# Patient Record
Sex: MALE | Race: WHITE | NOT HISPANIC OR LATINO
[De-identification: names, ages, dates, MRNs, and addresses within clinical notes are randomized per-mention and may not be internally consistent; named-entity substitution may affect disease eponyms.]

---

## 2017-01-13 ENCOUNTER — APPOINTMENT (OUTPATIENT)
Dept: CARDIOLOGY | Facility: CLINIC | Age: 72
End: 2017-01-13

## 2017-04-12 ENCOUNTER — APPOINTMENT (OUTPATIENT)
Dept: CARDIOLOGY | Facility: CLINIC | Age: 72
End: 2017-04-12

## 2017-04-12 VITALS
HEIGHT: 69 IN | SYSTOLIC BLOOD PRESSURE: 136 MMHG | HEART RATE: 103 BPM | DIASTOLIC BLOOD PRESSURE: 80 MMHG | WEIGHT: 205 LBS | BODY MASS INDEX: 30.36 KG/M2

## 2017-06-06 ENCOUNTER — MEDICATION RENEWAL (OUTPATIENT)
Age: 72
End: 2017-06-06

## 2017-09-07 ENCOUNTER — APPOINTMENT (OUTPATIENT)
Dept: CARDIOLOGY | Facility: CLINIC | Age: 72
End: 2017-09-07

## 2017-09-07 VITALS
HEART RATE: 99 BPM | WEIGHT: 209 LBS | DIASTOLIC BLOOD PRESSURE: 78 MMHG | BODY MASS INDEX: 30.96 KG/M2 | HEIGHT: 69 IN | SYSTOLIC BLOOD PRESSURE: 130 MMHG

## 2017-09-07 DIAGNOSIS — R07.9 CHEST PAIN, UNSPECIFIED: ICD-10-CM

## 2018-01-11 ENCOUNTER — APPOINTMENT (OUTPATIENT)
Dept: CARDIOLOGY | Facility: CLINIC | Age: 73
End: 2018-01-11

## 2018-01-11 VITALS
DIASTOLIC BLOOD PRESSURE: 78 MMHG | BODY MASS INDEX: 30.36 KG/M2 | SYSTOLIC BLOOD PRESSURE: 146 MMHG | HEIGHT: 69 IN | HEART RATE: 106 BPM | WEIGHT: 205 LBS

## 2018-05-30 ENCOUNTER — RX RENEWAL (OUTPATIENT)
Age: 73
End: 2018-05-30

## 2018-06-14 ENCOUNTER — APPOINTMENT (OUTPATIENT)
Dept: CARDIOLOGY | Facility: CLINIC | Age: 73
End: 2018-06-14

## 2018-06-14 VITALS
BODY MASS INDEX: 29.33 KG/M2 | HEART RATE: 100 BPM | SYSTOLIC BLOOD PRESSURE: 136 MMHG | WEIGHT: 198 LBS | HEIGHT: 69 IN | DIASTOLIC BLOOD PRESSURE: 60 MMHG

## 2018-06-14 DIAGNOSIS — Z98.61 CORONARY ANGIOPLASTY STATUS: ICD-10-CM

## 2018-07-10 ENCOUNTER — APPOINTMENT (OUTPATIENT)
Dept: SURGERY | Facility: CLINIC | Age: 73
End: 2018-07-10
Payer: MEDICARE

## 2018-07-10 VITALS — HEIGHT: 69 IN | BODY MASS INDEX: 29.33 KG/M2 | WEIGHT: 198 LBS

## 2018-07-10 DIAGNOSIS — K40.30 UNILATERAL INGUINAL HERNIA, WITH OBSTRUCTION, W/OUT GANGRENE, NOT SPECIFIED AS RECURRENT: ICD-10-CM

## 2018-07-10 DIAGNOSIS — I25.10 ATHEROSCLEROTIC HEART DISEASE OF NATIVE CORONARY ARTERY W/OUT ANGINA PECTORIS: ICD-10-CM

## 2018-07-10 DIAGNOSIS — N43.0 ENCYSTED HYDROCELE: ICD-10-CM

## 2018-07-10 DIAGNOSIS — I10 ESSENTIAL (PRIMARY) HYPERTENSION: ICD-10-CM

## 2018-07-10 PROCEDURE — 99204 OFFICE O/P NEW MOD 45 MIN: CPT

## 2018-07-20 ENCOUNTER — INPATIENT (INPATIENT)
Facility: HOSPITAL | Age: 73
LOS: 5 days | Discharge: HOME | End: 2018-07-26
Attending: INTERNAL MEDICINE | Admitting: INTERNAL MEDICINE
Payer: MEDICARE

## 2018-07-20 VITALS
OXYGEN SATURATION: 98 % | DIASTOLIC BLOOD PRESSURE: 75 MMHG | TEMPERATURE: 97 F | RESPIRATION RATE: 19 BRPM | HEART RATE: 108 BPM | SYSTOLIC BLOOD PRESSURE: 136 MMHG

## 2018-07-20 LAB
ALBUMIN SERPL ELPH-MCNC: 3.5 G/DL — SIGNIFICANT CHANGE UP (ref 3.5–5.2)
ALP SERPL-CCNC: 317 U/L — HIGH (ref 30–115)
ALT FLD-CCNC: 36 U/L — SIGNIFICANT CHANGE UP (ref 0–41)
ANION GAP SERPL CALC-SCNC: 15 MMOL/L — HIGH (ref 7–14)
AST SERPL-CCNC: 139 U/L — HIGH (ref 0–41)
BASOPHILS # BLD AUTO: 0.01 K/UL — SIGNIFICANT CHANGE UP (ref 0–0.2)
BASOPHILS NFR BLD AUTO: 0.2 % — SIGNIFICANT CHANGE UP (ref 0–1)
BILIRUB DIRECT SERPL-MCNC: 0.6 MG/DL — HIGH (ref 0–0.2)
BILIRUB INDIRECT FLD-MCNC: 1.2 MG/DL — SIGNIFICANT CHANGE UP (ref 0.2–1.2)
BILIRUB SERPL-MCNC: 1.7 MG/DL — HIGH (ref 0.2–1.2)
BILIRUB SERPL-MCNC: 1.8 MG/DL — HIGH (ref 0.2–1.2)
BUN SERPL-MCNC: 13 MG/DL — SIGNIFICANT CHANGE UP (ref 10–20)
CALCIUM SERPL-MCNC: 9.1 MG/DL — SIGNIFICANT CHANGE UP (ref 8.5–10.1)
CHLORIDE SERPL-SCNC: 100 MMOL/L — SIGNIFICANT CHANGE UP (ref 98–110)
CO2 SERPL-SCNC: 25 MMOL/L — SIGNIFICANT CHANGE UP (ref 17–32)
CREAT SERPL-MCNC: 0.8 MG/DL — SIGNIFICANT CHANGE UP (ref 0.7–1.5)
EOSINOPHIL # BLD AUTO: 0.06 K/UL — SIGNIFICANT CHANGE UP (ref 0–0.7)
EOSINOPHIL NFR BLD AUTO: 1.4 % — SIGNIFICANT CHANGE UP (ref 0–8)
GLUCOSE SERPL-MCNC: 104 MG/DL — HIGH (ref 70–99)
HCT VFR BLD CALC: 42.7 % — SIGNIFICANT CHANGE UP (ref 42–52)
HGB BLD-MCNC: 13.9 G/DL — LOW (ref 14–18)
IMM GRANULOCYTES NFR BLD AUTO: 0.5 % — HIGH (ref 0.1–0.3)
LIDOCAIN IGE QN: 20 U/L — SIGNIFICANT CHANGE UP (ref 7–60)
LYMPHOCYTES # BLD AUTO: 0.75 K/UL — LOW (ref 1.2–3.4)
LYMPHOCYTES # BLD AUTO: 17.2 % — LOW (ref 20.5–51.1)
MCHC RBC-ENTMCNC: 30 PG — SIGNIFICANT CHANGE UP (ref 27–31)
MCHC RBC-ENTMCNC: 32.6 G/DL — SIGNIFICANT CHANGE UP (ref 32–37)
MCV RBC AUTO: 92.2 FL — SIGNIFICANT CHANGE UP (ref 80–94)
MONOCYTES # BLD AUTO: 0.39 K/UL — SIGNIFICANT CHANGE UP (ref 0.1–0.6)
MONOCYTES NFR BLD AUTO: 8.9 % — SIGNIFICANT CHANGE UP (ref 1.7–9.3)
NEUTROPHILS # BLD AUTO: 3.14 K/UL — SIGNIFICANT CHANGE UP (ref 1.4–6.5)
NEUTROPHILS NFR BLD AUTO: 71.8 % — SIGNIFICANT CHANGE UP (ref 42.2–75.2)
NRBC # BLD: 0 /100 WBCS — SIGNIFICANT CHANGE UP (ref 0–0)
PLATELET # BLD AUTO: 132 K/UL — SIGNIFICANT CHANGE UP (ref 130–400)
POTASSIUM SERPL-MCNC: 4.5 MMOL/L — SIGNIFICANT CHANGE UP (ref 3.5–5)
POTASSIUM SERPL-SCNC: 4.5 MMOL/L — SIGNIFICANT CHANGE UP (ref 3.5–5)
PROT SERPL-MCNC: 6.9 G/DL — SIGNIFICANT CHANGE UP (ref 6–8)
RBC # BLD: 4.63 M/UL — LOW (ref 4.7–6.1)
RBC # FLD: 13.9 % — SIGNIFICANT CHANGE UP (ref 11.5–14.5)
SODIUM SERPL-SCNC: 140 MMOL/L — SIGNIFICANT CHANGE UP (ref 135–146)
WBC # BLD: 4.37 K/UL — LOW (ref 4.8–10.8)
WBC # FLD AUTO: 4.37 K/UL — LOW (ref 4.8–10.8)

## 2018-07-20 RX ORDER — SODIUM CHLORIDE 9 MG/ML
1000 INJECTION INTRAMUSCULAR; INTRAVENOUS; SUBCUTANEOUS
Qty: 0 | Refills: 0 | Status: DISCONTINUED | OUTPATIENT
Start: 2018-07-20 | End: 2018-07-22

## 2018-07-20 RX ORDER — IOHEXOL 300 MG/ML
30 INJECTION, SOLUTION INTRAVENOUS ONCE
Qty: 0 | Refills: 0 | Status: COMPLETED | OUTPATIENT
Start: 2018-07-20 | End: 2018-07-20

## 2018-07-20 RX ADMIN — SODIUM CHLORIDE 100 MILLILITER(S): 9 INJECTION INTRAMUSCULAR; INTRAVENOUS; SUBCUTANEOUS at 18:53

## 2018-07-20 RX ADMIN — IOHEXOL 30 MILLILITER(S): 300 INJECTION, SOLUTION INTRAVENOUS at 18:53

## 2018-07-20 NOTE — ED PROVIDER NOTE - PROGRESS NOTE DETAILS
spoke to vascular team for consult, requested triple phase ct.  will come eval pt vascular team recommends heparin and admission to medicine for malignancy w/u

## 2018-07-20 NOTE — ED PROVIDER NOTE - NS ED ROS FT
Review of Systems:  	•	CONSTITUTIONAL - no fever, no diaphoresis, + weight change  	•	SKIN - no rash  	•	HEMATOLOGIC - no bleeding, no bruising  	•	EYES - no eye pain, no blurred vision  	•	ENT - no change in hearing, no pain  	•	RESPIRATORY - no shortness of breath, no cough  	•	CARDIAC - no chest pain, no palpitations  	•	GI -  abd bloating, no nausea, no vomiting, no diarrhea, + constipation, no bleeding  	•	 - no dysuria, no hematuria, no flank pain, no urinary retention  	•	MUSCULOSKELETAL - no joint paint, no swelling, no redness  	•	NEUROLOGIC - no weakness, no headache, no paresthesias, no dizziness

## 2018-07-20 NOTE — ED PROVIDER NOTE - PHYSICAL EXAMINATION
VITAL SIGNS: I have reviewed nursing notes and confirm.  CONSTITUTIONAL: Well-developed; well-nourished; in no acute distress.  SKIN: Skin exam is warm and dry, no acute rash.  HEAD: Normocephalic; atraumatic.  EYES: PERRL, EOM intact; conjunctiva and sclera clear.  ENT: No nasal discharge; airway clear. TMs clear.  NECK: Supple; non tender.  CARD: S1, S2 normal; no murmurs, gallops, or rubs. Regular rate and rhythm.  RESP: No wheezes, rales or rhonchi.  ABD: Normal bowel sounds; soft; non-distended; mildly ttp suprapubic and right flank  EXT: Normal ROM. No clubbing, cyanosis or edema.

## 2018-07-20 NOTE — CONSULT NOTE ADULT - SUBJECTIVE AND OBJECTIVE BOX
VASCULAR SURGERY CONSULT NOTE    HPI: 71 yo m with pmh of hld and hernia, sent by pmd for evaluation of main portal vein occlusion.  pt says had been having abd bloating for about 2-3 weeks with mild constipation, went to pmd who did blood work.  LFTs were elevated on labs, so sent for outpt US today.  on US today, found to have multiple masses on liver, unclear if mets or primary liver malignancy and also cirrhosis.  Patient has been also found to have main portal vein occlusion so vascular was consulted  PAST MEDICAL & SURGICAL HISTORY:  Hernia  High cholesterol    ALLERGIES:No Known Allergies    HOME MEDS: Home Medications:  Aspir 81 oral delayed release tablet: 1 tab(s) orally once a day (20 Jul 2018 18:58)  Lipitor:  (20 Jul 2018 18:58)      PHYSICAL EXAM  Vital Signs Last 24 Hrs  T(C): 36.3 (20 Jul 2018 15:59), Max: 36.3 (20 Jul 2018 15:59)  T(F): 97.3 (20 Jul 2018 15:59), Max: 97.3 (20 Jul 2018 15:59)  HR: 108 (20 Jul 2018 15:59) (108 - 108)  BP: 136/75 (20 Jul 2018 15:59) (136/75 - 136/75)  BP(mean): --  RR: 19 (20 Jul 2018 15:59) (19 - 19)  SpO2: 98% (20 Jul 2018 15:59) (98% - 98%)  	  Gen: no acute distress, sitting up in bed.  RESP: No wheezes, rales or rhonchi.  ABD: Normal bowel sounds; soft; non-distended; tender to palpation in ruq, no guarding or rigidity.   EXT: Normal ROM. No clubbing, cyanosis or edema, palpable dp/pt pulses b/l.       MEDICATIONS:   MEDICATIONS  (STANDING):  sodium chloride 0.9%. 1000 milliLiter(s) (100 mL/Hr) IV Continuous <Continuous>    MEDICATIONS  (PRN):      LAB/STUDIES:                        13.9   4.37  )-----------( 132      ( 20 Jul 2018 18:05 )             42.7     07-20    140  |  100  |  13  ----------------------------<  104<H>  4.5   |  25  |  0.8    Ca    9.1      20 Jul 2018 18:05    TPro  6.9  /  Alb  3.5  /  TBili  1.7<H>  /  DBili  0.6<H>  /  AST  139<H>  /  ALT  36  /  AlkPhos  317<H>  07-20      LIVER FUNCTIONS - ( 20 Jul 2018 18:05 )  Alb: 3.5 g/dL / Pro: 6.9 g/dL / ALK PHOS: 317 U/L / ALT: 36 U/L / AST: 139 U/L / GGT: x             IMAGING: pending

## 2018-07-20 NOTE — CONSULT NOTE ADULT - ASSESSMENT
A: 71 yo m with pmh of hld and hernia, sent by pmd for evaluation. Patient states he has been having abd bloating for about 2-3 weeks with mild constipation, went to pmd who did blood work.  LFTs were elevated on labs, so sent for outpt US today.  on US today, found to have multiple masses on liver, unclear if mets or primary liver malignancy and also cirrhosis.  Patient has been also found to have main portal vein occlusion so vascular was consulted.    Plan:  -f/u imaging A: 71 yo m with pmh of hld and hernia, sent by pmd for evaluation. Patient states he has been having abd bloating for about 2-3 weeks with mild constipation, went to pmd who did blood work.  LFTs were elevated on labs, so sent for outpt US today.  on US today, found to have multiple masses on liver, unclear if mets or primary liver malignancy and also cirrhosis.  Patient has been also found to have main portal vein occlusion so vascular was consulted.    Plan:  -recommend anticoagulation

## 2018-07-20 NOTE — ED ADULT NURSE NOTE - OBJECTIVE STATEMENT
Pt was sent in by PMD for abnormal US. Pt states his md told him he has an obstruction by the liver.

## 2018-07-20 NOTE — ED PROVIDER NOTE - MEDICAL DECISION MAKING DETAILS
pt with portal vein occlusion likely 2/2 newly found liver malignancy.  will admit for malignancy w/u and antiocoag

## 2018-07-20 NOTE — ED PROVIDER NOTE - OBJECTIVE STATEMENT
71 yo m with pmh of hld and hernia, sent by pmd for evaluation of main portal vein occlusion.  pt says had been having abd bloating for about 2-3 weeks with mild constipation, went to pmd who did blood work.  LFTs were elevated on labs, so sent for outpt US today.  on US today, found to have multiple masses on liver, unclear if mets or primary liver malignancy and also cirrhosis.  also found to have main portal vein occlusion.  no n/v/d, no fever, no chills, no cp, no sob.  noted 20 lb involuntary weight loss

## 2018-07-21 DIAGNOSIS — I25.10 ATHEROSCLEROTIC HEART DISEASE OF NATIVE CORONARY ARTERY WITHOUT ANGINA PECTORIS: Chronic | ICD-10-CM

## 2018-07-21 LAB
ALBUMIN SERPL ELPH-MCNC: 3.8 G/DL — SIGNIFICANT CHANGE UP (ref 3.5–5.2)
ALP SERPL-CCNC: 319 U/L — HIGH (ref 30–115)
ALT FLD-CCNC: 38 U/L — SIGNIFICANT CHANGE UP (ref 0–41)
ANION GAP SERPL CALC-SCNC: 18 MMOL/L — HIGH (ref 7–14)
APTT BLD: 34.6 SEC — SIGNIFICANT CHANGE UP (ref 27–39.2)
APTT BLD: 54.2 SEC — HIGH (ref 27–39.2)
APTT BLD: 63.3 SEC — HIGH (ref 27–39.2)
AST SERPL-CCNC: 143 U/L — HIGH (ref 0–41)
BASOPHILS # BLD AUTO: 0.03 K/UL — SIGNIFICANT CHANGE UP (ref 0–0.2)
BASOPHILS NFR BLD AUTO: 0.4 % — SIGNIFICANT CHANGE UP (ref 0–1)
BILIRUB DIRECT SERPL-MCNC: 0.7 MG/DL — HIGH (ref 0–0.2)
BILIRUB SERPL-MCNC: 2.2 MG/DL — HIGH (ref 0.2–1.2)
BLD GP AB SCN SERPL QL: SIGNIFICANT CHANGE UP
BLD GP AB SCN SERPL QL: SIGNIFICANT CHANGE UP
BUN SERPL-MCNC: 11 MG/DL — SIGNIFICANT CHANGE UP (ref 10–20)
CALCIUM SERPL-MCNC: 9.2 MG/DL — SIGNIFICANT CHANGE UP (ref 8.5–10.1)
CHLORIDE SERPL-SCNC: 99 MMOL/L — SIGNIFICANT CHANGE UP (ref 98–110)
CO2 SERPL-SCNC: 22 MMOL/L — SIGNIFICANT CHANGE UP (ref 17–32)
CREAT SERPL-MCNC: 0.8 MG/DL — SIGNIFICANT CHANGE UP (ref 0.7–1.5)
EOSINOPHIL # BLD AUTO: 0.12 K/UL — SIGNIFICANT CHANGE UP (ref 0–0.7)
EOSINOPHIL NFR BLD AUTO: 1.8 % — SIGNIFICANT CHANGE UP (ref 0–8)
GLUCOSE SERPL-MCNC: 98 MG/DL — SIGNIFICANT CHANGE UP (ref 70–99)
HCT VFR BLD CALC: 43 % — SIGNIFICANT CHANGE UP (ref 42–52)
HGB BLD-MCNC: 14.1 G/DL — SIGNIFICANT CHANGE UP (ref 14–18)
IMM GRANULOCYTES NFR BLD AUTO: 0.1 % — SIGNIFICANT CHANGE UP (ref 0.1–0.3)
INR BLD: 1.38 RATIO — HIGH (ref 0.65–1.3)
INR BLD: 1.45 RATIO — HIGH (ref 0.65–1.3)
LDH SERPL L TO P-CCNC: 269 U/L — HIGH (ref 50–242)
LYMPHOCYTES # BLD AUTO: 1.34 K/UL — SIGNIFICANT CHANGE UP (ref 1.2–3.4)
LYMPHOCYTES # BLD AUTO: 19.6 % — LOW (ref 20.5–51.1)
MAGNESIUM SERPL-MCNC: 1.9 MG/DL — SIGNIFICANT CHANGE UP (ref 1.8–2.4)
MCHC RBC-ENTMCNC: 29.7 PG — SIGNIFICANT CHANGE UP (ref 27–31)
MCHC RBC-ENTMCNC: 32.8 G/DL — SIGNIFICANT CHANGE UP (ref 32–37)
MCV RBC AUTO: 90.7 FL — SIGNIFICANT CHANGE UP (ref 80–94)
MONOCYTES # BLD AUTO: 0.52 K/UL — SIGNIFICANT CHANGE UP (ref 0.1–0.6)
MONOCYTES NFR BLD AUTO: 7.6 % — SIGNIFICANT CHANGE UP (ref 1.7–9.3)
NEUTROPHILS # BLD AUTO: 4.82 K/UL — SIGNIFICANT CHANGE UP (ref 1.4–6.5)
NEUTROPHILS NFR BLD AUTO: 70.5 % — SIGNIFICANT CHANGE UP (ref 42.2–75.2)
NRBC # BLD: 0 /100 WBCS — SIGNIFICANT CHANGE UP (ref 0–0)
PLATELET # BLD AUTO: 169 K/UL — SIGNIFICANT CHANGE UP (ref 130–400)
POTASSIUM SERPL-MCNC: 4.6 MMOL/L — SIGNIFICANT CHANGE UP (ref 3.5–5)
POTASSIUM SERPL-SCNC: 4.6 MMOL/L — SIGNIFICANT CHANGE UP (ref 3.5–5)
PROT SERPL-MCNC: 7.1 G/DL — SIGNIFICANT CHANGE UP (ref 6–8)
PROTHROM AB SERPL-ACNC: 14.9 SEC — HIGH (ref 9.95–12.87)
PROTHROM AB SERPL-ACNC: 15.6 SEC — HIGH (ref 9.95–12.87)
RBC # BLD: 4.74 M/UL — SIGNIFICANT CHANGE UP (ref 4.7–6.1)
RBC # FLD: 13.9 % — SIGNIFICANT CHANGE UP (ref 11.5–14.5)
SODIUM SERPL-SCNC: 139 MMOL/L — SIGNIFICANT CHANGE UP (ref 135–146)
TYPE + AB SCN PNL BLD: SIGNIFICANT CHANGE UP
TYPE + AB SCN PNL BLD: SIGNIFICANT CHANGE UP
WBC # BLD: 6.84 K/UL — SIGNIFICANT CHANGE UP (ref 4.8–10.8)
WBC # FLD AUTO: 6.84 K/UL — SIGNIFICANT CHANGE UP (ref 4.8–10.8)

## 2018-07-21 RX ORDER — HEPARIN SODIUM 5000 [USP'U]/ML
1600 INJECTION INTRAVENOUS; SUBCUTANEOUS
Qty: 25000 | Refills: 0 | Status: DISCONTINUED | OUTPATIENT
Start: 2018-07-21 | End: 2018-07-21

## 2018-07-21 RX ORDER — ATORVASTATIN CALCIUM 80 MG/1
0 TABLET, FILM COATED ORAL
Qty: 0 | Refills: 0 | COMMUNITY

## 2018-07-21 RX ORDER — HEPARIN SODIUM 5000 [USP'U]/ML
1100 INJECTION INTRAVENOUS; SUBCUTANEOUS
Qty: 25000 | Refills: 0 | Status: DISCONTINUED | OUTPATIENT
Start: 2018-07-21 | End: 2018-07-23

## 2018-07-21 RX ORDER — HEPARIN SODIUM 5000 [USP'U]/ML
3000 INJECTION INTRAVENOUS; SUBCUTANEOUS ONCE
Qty: 0 | Refills: 0 | Status: COMPLETED | OUTPATIENT
Start: 2018-07-21 | End: 2018-07-21

## 2018-07-21 RX ORDER — ASPIRIN/CALCIUM CARB/MAGNESIUM 324 MG
81 TABLET ORAL DAILY
Qty: 0 | Refills: 0 | Status: DISCONTINUED | OUTPATIENT
Start: 2018-07-21 | End: 2018-07-23

## 2018-07-21 RX ORDER — ATORVASTATIN CALCIUM 80 MG/1
40 TABLET, FILM COATED ORAL AT BEDTIME
Qty: 0 | Refills: 0 | Status: DISCONTINUED | OUTPATIENT
Start: 2018-07-21 | End: 2018-07-21

## 2018-07-21 RX ORDER — HEPARIN SODIUM 5000 [USP'U]/ML
900 INJECTION INTRAVENOUS; SUBCUTANEOUS
Qty: 25000 | Refills: 0 | Status: DISCONTINUED | OUTPATIENT
Start: 2018-07-21 | End: 2018-07-21

## 2018-07-21 RX ADMIN — HEPARIN SODIUM 9 UNIT(S)/HR: 5000 INJECTION INTRAVENOUS; SUBCUTANEOUS at 02:38

## 2018-07-21 RX ADMIN — HEPARIN SODIUM 3000 UNIT(S): 5000 INJECTION INTRAVENOUS; SUBCUTANEOUS at 02:38

## 2018-07-21 RX ADMIN — Medication 81 MILLIGRAM(S): at 13:12

## 2018-07-21 RX ADMIN — HEPARIN SODIUM 1100 UNIT(S)/HR: 5000 INJECTION INTRAVENOUS; SUBCUTANEOUS at 09:25

## 2018-07-21 NOTE — H&P ADULT - ATTENDING COMMENTS
patient seen and examined , agree with pgy 3 assesment and plan except as indicated above,   #Acute portal vein thrombosis secondary to neoplasm likely hcc  appreciate gi cosnult, need workup , mri , dw gi fellow , further patient is currently on anticoagualtion for now until clarrified if thrombosis is tumor invasion or not, oncology consult   #Splenomegaly   #degenerative changes in shoulder  #prolonged qt on ekg - repeat ekg in am   dw pmd who reviewed records via telephone

## 2018-07-21 NOTE — CONSULT NOTE ADULT - ASSESSMENT
72 year old man with a past medical history of HLD and Hernia was getting blood work for hernia surgery found to have increase lever enzymes , sent for us found to have cirrhosis. Patient been complaining of abdominal bloating for about a week, with no pain, associated with non bloody diarrhea , 6 episodes daily, with mucous, not related to food and wakes him up at night.     Newly diagnosed cirrhosis with hepatic tumor likely HCC with tumor thrombus  If it is tumor thrombus not a bland thrombus  anticoagulation is contraindicated,  will discuss with radiology   possible HCC with macrovascular invasion, with possible metastasis:  MRI with liver protocol, bao fetoprotein, if it  confirms the diagnosis   poor prognosis, and  not candidate for transplant  child davis score: A  Meld score Na: 13  needs systemic therapy , recommend oncology eval   newly diagnosed cirrhosis needs CLD workup: hepatits panel, ABBI, AMA, ASMA, SPEP, Ceruloplasmin, ferritin, transferin saturation, ANTI LKM1, ANTI SLA  needs EGD for screening for varices, specially if he is candidate for anticoagulation   Needs screening  colonoscopy   will discuss with attending 72 year old man with a past medical history of HLD and Hernia was getting blood work for hernia surgery found to have increase Liver enzymes ,  found to have cirrhosis. Patient been complaining of abdominal bloating for about a week, with no pain, associated with non bloody diarrhea , 6 episodes daily, with mucous, not related to food and wakes him up at night.     Newly diagnosed cirrhosis of unclear etiology with hepatic tumor likely HCC with tumor thrombus  If it is tumor thrombus not a bland thrombus  anticoagulation is contraindicated,  will discuss with radiology   -possible HCC with macrovascular invasion, with possible metastasis:  MRI with liver protocol, bao fetoprotein, if it  confirms the diagnosis   poor prognosis, and  not -candidate for transplant  -child davis score: A  -Meld score Na: 13  -needs IR for TACE or Y90 , also SORAFINIB systemic therapy   -newly diagnosed cirrhosis needs CLD workup: hepatits panel, ABBI, AMA, ASMA, SPEP, Ceruloplasmin, ferritin, transferin saturation, ANTI LKM1, ANTI SLA  needs EGD for screening for varices, specially if he is candidate for anticoagulation

## 2018-07-21 NOTE — H&P ADULT - NSHPPHYSICALEXAM_GEN_ALL_CORE
Vital Signs Last 24 Hrs  T(C): 36.3 (20 Jul 2018 15:59), Max: 36.3 (20 Jul 2018 15:59)  T(F): 97.3 (20 Jul 2018 15:59), Max: 97.3 (20 Jul 2018 15:59)  HR: 108 (20 Jul 2018 15:59) (108 - 108)  BP: 136/75 (20 Jul 2018 15:59) (136/75 - 136/75)  BP(mean): --  RR: 19 (20 Jul 2018 15:59) (19 - 19)  SpO2: 98% (20 Jul 2018 15:59) (98% - 98%)    General: Well-developed; well-nourished; NAD, sitting up in bed  HEENT: NCAT, EOMI, PERRLA, conjuctiva and sclera clear, no nasal discharge, no oropharyngeal erythema, neck supple and non tender  Cardio: S1 S2 normal, RRR, no murmurs  Lungs: CTAB, GBAE, no crackles, no wheezes  Abdomen: +BS, soft, mild tenderness to palpation at RUQ, negative Back, splenomegaly, no shifting dullness appreciated, no guarding, no rigidity,   Extremities: Normal ROM, +2 PP b/l, -ve LLE b/l  Skin: warm and dry, no acute rash, no bruising, no ecchymosis

## 2018-07-21 NOTE — H&P ADULT - NSHPLABSRESULTS_GEN_ALL_CORE
Labs                          13.9   4.37  )-----------( 132      ( 20 Jul 2018 18:05 )             42.7     07-20    140  |  100  |  13  ----------------------------<  104<H>  4.5   |  25  |  0.8    Ca    9.1      20 Jul 2018 18:05    TPro  6.9  /  Alb  3.5  /  TBili  1.7<H>  /  DBili  0.6<H>  /  AST  139<H>  /  ALT  36  /  AlkPhos  317<H>  07-20    PT/INR - ( 21 Jul 2018 00:37 )   PT: 15.60 sec;   INR: 1.45 ratio    PTT - ( 21 Jul 2018 00:37 )  PTT:34.6 sec    Lipase, Serum (07.20.18 @ 18:05)    Lipase, Serum: 20 U/L      Radiology  < from: Xray Chest 1 View-PORTABLE IMMEDIATE (07.20.18 @ 18:49) >  No radiographic evidence of acute cardiopulmonary disease.  < end of copied text >    < from: CT Angio Abdomen and Pelvis w/ IV Cont (07.20.18 @ 22:35) >  Cholelithiasis.  1. Findings consistent with diffuse tumor infiltration, likely HCC, throughout right hepatic lobe up to 15 cm with of the right intrahepatic and distal extrahepatic portal veins; likely tumor thrombosis. Additional, at least two hypovascular masses within the left hepatic lobe, measuring up to 1.2 cm at segment II, suggesting metastatic HCC. Partially exophytic 6 cm component off right inferior hepatic tip of tumor.  2. Liver cirrhosis with portal hypertension.  3. Nonspecific enlarged portacaval lymph nodes measuring up to 2.1 x 1.7 cm.    KIDNEYS: Subcentimeter bilateral renal hypodensities, too small to fully characterize. No hydronephrosis.  ABDOMINOPELVIC NODES: Nonspecific enlarged portacaval lymph nodes measuring up to 2.1 x 1.7 cm.  PERITONEUM/MESENTERY/BOWEL: No bowel obstruction. Mild ascites.  SPLEEN: Splenomegaly, 16.4 cm CC.  < end of copied text >

## 2018-07-21 NOTE — CONSULT NOTE ADULT - SUBJECTIVE AND OBJECTIVE BOX
GI HPI:  72 year old man with a past medical history of HLD and Hernia was getting blood work for hernia surgery found to have increase lever enzymes , sent for us found to have cirrhosis. Patient been complaining of abdominal bloating for about a week, with no pain, associated with non bloody diarrhea , 6 episodes daily, with mucous, not related to food and wakes him up at night. Patient denies any hx of GI bleed in the past. His father has a liver cirrhosis secondary to alcohol use  He denies drinking alcohol or smoking   Denies any hx of liver problem or hepatitis      Hospital course:  72 year old man with a past medical history of HLD and Hernia presenting for incidental finding of portal vein thrombosis after result of elevated LFTs on regular blood work. Patient reports that he has been having abominal bloating for about 2-3 weeks with mild constipation. PMD did blood work which showed increased LFTs and send him to get an outpatient US which showed multiple masses on liver, unclear if mets or primary liver malignancy and also cirrhosis as well as main portal vein occlusion. No fevers, chills, nausea, vomiting, diarrhea, chest pain, SOB, lower extremity swelling/warmth. Patient reports a 20lb weight loss. Patient reports that he has occasional tremors when he tries to hold something that has started 8 months ago. (21 Jul 2018 02:26)      PAST MEDICAL & SURGICAL HISTORY  Incarcerated right inguinal hernia  CAD (coronary artery disease)  Hypercholesterolemia  CAD S/P percutaneous coronary angioplasty: no stents      FAMILY HISTORY:  FAMILY HISTORY:      SOCIAL HISTORY:  smoker:   Alcohol:  Drug:    ALLERGIES:  No Known Allergies      MEDICATIONS:  MEDICATIONS  (STANDING):  aspirin enteric coated 81 milliGRAM(s) Oral daily  heparin  Infusion. 1100 Unit(s)/Hr (11 mL/Hr) IV Continuous <Continuous>  sodium chloride 0.9%. 1000 milliLiter(s) (100 mL/Hr) IV Continuous <Continuous>    MEDICATIONS  (PRN):      HOME MEDICATIONS:  Home Medications:  Aspir 81 oral delayed release tablet: 1 tab(s) orally once a day (20 Jul 2018 18:58)  Lipitor: 40 milligram(s) orally once a day (at bedtime) (21 Jul 2018 02:52)      ROS:     REVIEW OF SYSTEMS:    CONSTITUTIONAL:  weakness, fatigue,  weight loss of 20 pounds unintentional   Throat: No Dysphagia, No Odynophagia  RESPIRATORY: No SOB  CARDIOVASCULAR: No chest pain   Muscloskeletal: no joints pain  NEUROLOGICAL: No syncope or diziness  SKIN: No pruritis  Heme/Lymph: no LN enlargement           VITALS:   T(F): 97.1 (07-21 @ 07:48), Max: 97.3 (07-20 @ 15:59)  HR: 95 (07-21 @ 07:48) (90 - 108)  BP: 136/69 (07-21 @ 07:48) (131/78 - 136/75)  BP(mean): --  RR: 18 (07-21 @ 07:48) (18 - 19)  SpO2: 97% (07-21 @ 07:48) (97% - 98%)    I&O's Summary      PHYSICAL EXAM:  EYES: No scleral icterus   LUNG: Clear to auscultation bilaterally; No rales, rhonchi, wheezing, or rubs  HEART: RRR; No murmurs  ABDOMEN: Soft, +BS,no abdominal tenderness no guarding   Rectal Exam: not performed    LABS:                        14.1   6.84  )-----------( 169      ( 21 Jul 2018 08:32 )             43.0     PT/INR - ( 21 Jul 2018 00:37 )  INR: 1.45          PTT - ( 21 Jul 2018 00:37 )  PTT:34.6   LIVER FUNCTIONS - ( 20 Jul 2018 18:05 )  Alb: 3.5 g/dL / Pro: 6.9 g/dL / ALK PHOS: 317 U/L / ALT: 36 U/L / AST: 139 U/L / GGT: x           07-20    140  |  100  |  13  ----------------------------<  104<H>  4.5   |  25  |  0.8    Ca    9.1      20 Jul 2018 18:05              Previous EGD: never had one    from: CT Angio Abdomen and Pelvis w/ IV Cont (07.20.18 @ 22:35) >  IMPRESSION:    1. Findings consistent with diffuse tumor infiltration, likely HCC,   throughout right hepatic lobe up to 15 cm with of the right intrahepatic   and distal extrahepatic portal veins; likely tumor thrombosis.   Additional, at least two hypovascular masses within the left hepatic   lobe, measuring up to 1.2 cm at segment II, suggesting metastatic HCC.    2. Liver cirrhosis with portal hypertension.    3. Nonspecific enlarged portacaval lymph nodes measuring up to 2.1 x 1.7   cm.    < end of copied text >  ad one    Previous colonoscopy: never had one

## 2018-07-21 NOTE — H&P ADULT - HISTORY OF PRESENT ILLNESS
72 year old man with a past medical history of HLD and Hernia presenting for incidental finding of portal vein thrombosis after result of elevated LFTs on regular blood work. 72 year old man with a past medical history of HLD and Hernia presenting for incidental finding of portal vein thrombosis after result of elevated LFTs on regular blood work. Patient reports that he has been having abominal bloating for about 2-3 weeks with mild constipation. PMD did blood work which showed increased LFTs and send him to get an outpatient US which showed multiple masses on liver, unclear if mets or primary liver malignancy and also cirrhosis as well as main portal vein occlusion. No fevers, chills, nausea, vomiting, diarrhea, chest pain, SOB, lower extremity swelling/warmth. Patient reports a 20lb weight loss. 72 year old man with a past medical history of HLD and Hernia presenting for incidental finding of portal vein thrombosis after result of elevated LFTs on regular blood work. Patient reports that he has been having abominal bloating for about 2-3 weeks with mild constipation. PMD did blood work which showed increased LFTs and send him to get an outpatient US which showed multiple masses on liver, unclear if mets or primary liver malignancy and also cirrhosis as well as main portal vein occlusion. No fevers, chills, nausea, vomiting, diarrhea, chest pain, SOB, lower extremity swelling/warmth. Patient reports a 20lb weight loss. Patient reports that he has occasional tremors when he tries to hold something that has started 8 months ago.

## 2018-07-21 NOTE — H&P ADULT - PMH
Hernia    Hypercholesterolemia CAD (coronary artery disease)    Hypercholesterolemia    Incarcerated right inguinal hernia

## 2018-07-21 NOTE — H&P ADULT - ASSESSMENT
72 year old man with a past medical history of HLD and Hernia presenting for incidental finding of portal vein thrombosis after result of elevated LFTs on regular blood work.    Hepatocellular Carcinoma with Liver cirrhosis and Right intrahepatic and distal extrahepatic portal vein thrombosis + metastasis to left hepatic lobe  - Patient asymptomatic except for mild RUQ tenderness. VS wnl. Hemodynamically stable.  - Vascular surgery: Heparin [id number] cc/hr + 3000 bolus for therapeutic AC. F/u PTT In AM. Goal 65-99.  - CEA, , AFP, Acute hepatitis panel, HBV-IgGs, HCV RNA, HDV-IgG, LDH.  - F/u PTT at 4:30, 11 and 16.  - 72 year old man with a past medical history of HLD and Hernia presenting for incidental finding of portal vein thrombosis after result of elevated LFTs on regular blood work.    Hepatocellular Carcinoma with Liver cirrhosis and Right intrahepatic and distal extrahepatic portal vein thrombosis + metastasis to left hepatic lobe  - Patient asymptomatic except for mild RUQ tenderness. VS wnl. Hemodynamically stable.  - MELD Score (new): no dialysis at least twice in the past week, Crea 0.8, T-Bili 1.7, INR 1.45, Na 140; MELD Score 13 points, 6% estimated 3 month mortality but MELD+NA Score 13 points & < 2% estimated 90 day mortality  - Child- Nicole Score; T-John < 2 (+1), Albumin 2.8-3.5 (+2), INR < 1.7 (+1), Ascites slight/mild (+2), No encephalopathy (+1) = 7 points Child Class B, indication for transplant evaluation, abdominal surgery alex-operative mortality 30%  - Vascular surgery: Heparin [id number] cc/hr + 3000 bolus for therapeutic AC. F/u PTT In AM. Goal 65-99. F/u PTT at 4:30, 11 and 16.  - CEA, , AFP, Acute hepatitis panel, HBV-IgGs, HCV RNA, HDV-IgG, LDH.  - Obtain US result as outpatient; will have to follow up every 6 months. Trend LFTs.  - It would have been preferable if patient underwent screening for esophageal varices with upper endoscopy so that prophylactic therapy with a nonselective BB or endoscopic ligation could be given to those with varices that are at increased risk for bleeding and to determine the risk of variceal hemorrhage.  - Do not diurese the patient unless hypoxic and ascites causing respiratory compromise. Overdiuresis concentrates ascitic fluid, thereby raising ascitic fluid opsonic activity. Overdiuresis can precipitate renal failure and result in hepatorenal syndrome.  - Mild ascites on CT scan, unsure if too minimal to tap. GI consult to evaluate if patient needs paracenthesis to r/o SBP. Avoid PPI as may increase risk of SBP.  - Hem/Onc and IR consults pending placement for biopsy, staining and staging after discussion of goals of care.  - CTC w/ IV, CTH w/ IV after 24 hours from CTAP w/ IV. Ordered placed. If any evidence of primary or metastatic disease, will consult Pulmonary or Neurology/Neurosurgery.  - Daily weight, strict I&O, EKG in AM  - TTE for assessment of baseline function and to r/o cardiomyopathy.    HLD; statin discontinued due to hepatotoxicity. Evaluate drug of choice for lipid lowering agent. Resume outpatient ASA 81.    DVT ppx; on Heparin drip.  Diet; DASH, low sodium, fluid < 1.5L 72 year old man with a past medical history of HLD and Hernia presenting for incidental finding of portal vein thrombosis after result of elevated LFTs on regular blood work.    Hepatocellular Carcinoma with Liver cirrhosis and Right intrahepatic and distal extrahepatic portal vein thrombosis + metastasis to left hepatic lobe  - Patient asymptomatic except for mild RUQ tenderness. VS wnl. Hemodynamically stable.  - MELD Score (new): no dialysis at least twice in the past week, Crea 0.8, T-Bili 1.7, INR 1.45, Na 140; MELD Score 13 points, 6% estimated 3 month mortality but MELD+NA Score 13 points & < 2% estimated 90 day mortality  - Child- Nicole Score; T-John < 2 (+1), Albumin 2.8-3.5 (+2), INR < 1.7 (+1), Ascites slight/mild (+2), No encephalopathy (+1) = 7 points Child Class B, indication for transplant evaluation, abdominal surgery alex-operative mortality 30%  - Vascular surgery: Heparin [id number] cc/hr + 3000 bolus for therapeutic AC. F/u PTT In AM. Goal 65-99. F/u PTT at 4:30, 11 and 16.  - CEA, , AFP, Acute hepatitis panel, HBV-IgGs, HCV RNA, HDV-IgG, LDH.  - Obtain US result as outpatient; will have to follow up every 6 months. Trend LFTs.  - It would have been preferable if patient underwent screening for esophageal varices with upper endoscopy so that prophylactic therapy with a nonselective BB or endoscopic ligation could be given to those with varices that are at increased risk for bleeding and to determine the risk of variceal hemorrhage.  - Do not diurese the patient unless hypoxic and ascites causing respiratory compromise. Overdiuresis concentrates ascitic fluid, thereby raising ascitic fluid opsonic activity. Overdiuresis can precipitate renal failure and result in hepatorenal syndrome.  - Mild ascites on CT scan, unsure if too minimal to tap. GI consult to evaluate if patient needs paracenthesis to r/o SBP. Avoid PPI as may increase risk of SBP.  - Hem/Onc and IR consults pending placement for biopsy, staining and staging after discussion of goals of care.  - CTC w/ IV, CTH w/ IV after 24 hours from CTAP w/ IV. Ordered placed. If any evidence of primary or metastatic disease, will consult Pulmonary or Neurology/Neurosurgery.  - Daily weight, strict I&O, EKG in AM  - TTE for assessment of baseline function and to r/o cardiomyopathy.    HLD; statin discontinued due to hepatotoxicity. Evaluate drug of choice for lipid lowering agent. Resume outpatient ASA 81.  DVT ppx; on Heparin drip.  Diet; DASH, low sodium, fluid < 1.5L  Please discuss goals of care with patient tomorrow in order to know if should get Hem/Onc and Interventional Radiology on board. 72 year old man with a past medical history of HLD and Hernia presenting for incidental finding of portal vein thrombosis after result of elevated LFTs on regular blood work.    Hepatocellular Carcinoma with Liver cirrhosis and Right intrahepatic and distal extrahepatic portal vein thrombosis + metastasis to left hepatic lobe  - Patient asymptomatic except for mild RUQ tenderness. VS wnl. Hemodynamically stable.  - MELD Score (new): no dialysis at least twice in the past week, Crea 0.8, T-Bili 1.7, INR 1.45, Na 140; MELD Score 13 points, 6% estimated 3 month mortality but MELD+NA Score 13 points & < 2% estimated 90 day mortality  - Child- Nicole Score; T-John < 2 (+1), Albumin 2.8-3.5 (+2), INR < 1.7 (+1), Ascites slight/mild (+2), No encephalopathy (+1) = 7 points Child Class B, indication for transplant evaluation, abdominal surgery alex-operative mortality 30%  - Vascular surgery: Heparin [id number] cc/hr + 3000 bolus for therapeutic AC. F/u PTT In AM. Goal 65-99. F/u PTT at 4:30, 11 and 16.  - CEA, , AFP, Acute hepatitis panel, HBV-IgGs, HCV RNA, HDV-IgG, LDH.  - Obtain US result as outpatient; will have to follow up every 6 months. Trend LFTs.  - It would have been preferable if patient underwent screening for esophageal varices with upper endoscopy so that prophylactic therapy with a nonselective BB or endoscopic ligation could be given to those with varices that are at increased risk for bleeding and to determine the risk of variceal hemorrhage.  - Do not diurese the patient unless hypoxic and ascites causing respiratory compromise. Overdiuresis concentrates ascitic fluid, thereby raising ascitic fluid opsonic activity. Overdiuresis can precipitate renal failure and result in hepatorenal syndrome.  - Mild ascites on CT scan, unsure if too minimal to tap. GI consult to evaluate if patient needs paracenthesis to r/o SBP. Avoid PPI as may increase risk of SBP.  - Hem/Onc and IR consults placemed for biopsy, staining and staging.  - CTC w/ IV, CTH w/ IV after 24 hours from CTAP w/ IV. Ordered placed. If any evidence of primary or metastatic disease, will consult Pulmonary or Neurology/Neurosurgery.  - Daily weight, strict I&O, EKG in AM  - TTE for assessment of baseline function and to r/o cardiomyopathy.    HLD; statin discontinued due to hepatotoxicity. Evaluate drug of choice for lipid lowering agent. Resume outpatient ASA 81.  DVT ppx; on Heparin drip.  Diet; DASH, low sodium, fluid < 1.5L  Full code, patient wants treatment

## 2018-07-22 LAB
AFP-TM SERPL-MCNC: HIGH NG/ML
ANION GAP SERPL CALC-SCNC: 15 MMOL/L — HIGH (ref 7–14)
APTT BLD: 57.3 SEC — HIGH (ref 27–39.2)
BASOPHILS # BLD AUTO: 0.01 K/UL — SIGNIFICANT CHANGE UP (ref 0–0.2)
BASOPHILS NFR BLD AUTO: 0.1 % — SIGNIFICANT CHANGE UP (ref 0–1)
BUN SERPL-MCNC: 21 MG/DL — HIGH (ref 10–20)
CALCIUM SERPL-MCNC: 9.2 MG/DL — SIGNIFICANT CHANGE UP (ref 8.5–10.1)
CANCER AG19-9 SERPL-ACNC: 58 U/ML — HIGH
CEA SERPL-MCNC: 1.2 NG/ML — SIGNIFICANT CHANGE UP (ref 0–3.8)
CHLORIDE SERPL-SCNC: 97 MMOL/L — LOW (ref 98–110)
CO2 SERPL-SCNC: 24 MMOL/L — SIGNIFICANT CHANGE UP (ref 17–32)
CREAT SERPL-MCNC: 1.2 MG/DL — SIGNIFICANT CHANGE UP (ref 0.7–1.5)
EOSINOPHIL # BLD AUTO: 0 K/UL — SIGNIFICANT CHANGE UP (ref 0–0.7)
EOSINOPHIL NFR BLD AUTO: 0 % — SIGNIFICANT CHANGE UP (ref 0–8)
GLUCOSE SERPL-MCNC: 141 MG/DL — HIGH (ref 70–99)
HAV IGM SER-ACNC: SIGNIFICANT CHANGE UP
HBV CORE IGM SER-ACNC: SIGNIFICANT CHANGE UP
HBV SURFACE AB SER-ACNC: SIGNIFICANT CHANGE UP
HBV SURFACE AG SER-ACNC: SIGNIFICANT CHANGE UP
HCT VFR BLD CALC: 37.2 % — LOW (ref 42–52)
HCV AB S/CO SERPL IA: 0.22 S/CO — SIGNIFICANT CHANGE UP
HCV AB SERPL-IMP: SIGNIFICANT CHANGE UP
HGB BLD-MCNC: 12.2 G/DL — LOW (ref 14–18)
IMM GRANULOCYTES NFR BLD AUTO: 0.6 % — HIGH (ref 0.1–0.3)
LYMPHOCYTES # BLD AUTO: 1.13 K/UL — LOW (ref 1.2–3.4)
LYMPHOCYTES # BLD AUTO: 9.7 % — LOW (ref 20.5–51.1)
MAGNESIUM SERPL-MCNC: 1.9 MG/DL — SIGNIFICANT CHANGE UP (ref 1.8–2.4)
MCHC RBC-ENTMCNC: 29.8 PG — SIGNIFICANT CHANGE UP (ref 27–31)
MCHC RBC-ENTMCNC: 32.8 G/DL — SIGNIFICANT CHANGE UP (ref 32–37)
MCV RBC AUTO: 91 FL — SIGNIFICANT CHANGE UP (ref 80–94)
MONOCYTES # BLD AUTO: 0.77 K/UL — HIGH (ref 0.1–0.6)
MONOCYTES NFR BLD AUTO: 6.6 % — SIGNIFICANT CHANGE UP (ref 1.7–9.3)
NEUTROPHILS # BLD AUTO: 9.67 K/UL — HIGH (ref 1.4–6.5)
NEUTROPHILS NFR BLD AUTO: 83 % — HIGH (ref 42.2–75.2)
NRBC # BLD: 0 /100 WBCS — SIGNIFICANT CHANGE UP (ref 0–0)
PHOSPHATE SERPL-MCNC: 5.3 MG/DL — HIGH (ref 2.1–4.9)
PLATELET # BLD AUTO: 248 K/UL — SIGNIFICANT CHANGE UP (ref 130–400)
POTASSIUM SERPL-MCNC: 6 MMOL/L — CRITICAL HIGH (ref 3.5–5)
POTASSIUM SERPL-SCNC: 6 MMOL/L — CRITICAL HIGH (ref 3.5–5)
RBC # BLD: 4.09 M/UL — LOW (ref 4.7–6.1)
RBC # FLD: 14.2 % — SIGNIFICANT CHANGE UP (ref 11.5–14.5)
SODIUM SERPL-SCNC: 136 MMOL/L — SIGNIFICANT CHANGE UP (ref 135–146)
WBC # BLD: 11.65 K/UL — HIGH (ref 4.8–10.8)
WBC # FLD AUTO: 11.65 K/UL — HIGH (ref 4.8–10.8)

## 2018-07-22 PROCEDURE — 99222 1ST HOSP IP/OBS MODERATE 55: CPT

## 2018-07-22 RX ORDER — SODIUM POLYSTYRENE SULFONATE 4.1 MEQ/G
15 POWDER, FOR SUSPENSION ORAL ONCE
Qty: 0 | Refills: 0 | Status: COMPLETED | OUTPATIENT
Start: 2018-07-22 | End: 2018-07-22

## 2018-07-22 RX ORDER — ONDANSETRON 8 MG/1
4 TABLET, FILM COATED ORAL ONCE
Qty: 0 | Refills: 0 | Status: COMPLETED | OUTPATIENT
Start: 2018-07-22 | End: 2018-07-22

## 2018-07-22 RX ORDER — INSULIN HUMAN 100 [IU]/ML
10 INJECTION, SOLUTION SUBCUTANEOUS ONCE
Qty: 0 | Refills: 0 | Status: COMPLETED | OUTPATIENT
Start: 2018-07-22 | End: 2018-07-22

## 2018-07-22 RX ORDER — SODIUM CHLORIDE 9 MG/ML
1000 INJECTION INTRAMUSCULAR; INTRAVENOUS; SUBCUTANEOUS
Qty: 0 | Refills: 0 | Status: DISCONTINUED | OUTPATIENT
Start: 2018-07-22 | End: 2018-07-23

## 2018-07-22 RX ADMIN — HEPARIN SODIUM 1100 UNIT(S)/HR: 5000 INJECTION INTRAVENOUS; SUBCUTANEOUS at 01:24

## 2018-07-22 RX ADMIN — Medication 81 MILLIGRAM(S): at 11:20

## 2018-07-22 RX ADMIN — HEPARIN SODIUM 1300 UNIT(S)/HR: 5000 INJECTION INTRAVENOUS; SUBCUTANEOUS at 21:06

## 2018-07-22 RX ADMIN — INSULIN HUMAN 10 UNIT(S): 100 INJECTION, SOLUTION SUBCUTANEOUS at 13:04

## 2018-07-22 RX ADMIN — SODIUM POLYSTYRENE SULFONATE 15 GRAM(S): 4.1 POWDER, FOR SUSPENSION ORAL at 13:04

## 2018-07-22 RX ADMIN — ONDANSETRON 4 MILLIGRAM(S): 8 TABLET, FILM COATED ORAL at 21:54

## 2018-07-22 RX ADMIN — SODIUM CHLORIDE 75 MILLILITER(S): 9 INJECTION INTRAMUSCULAR; INTRAVENOUS; SUBCUTANEOUS at 21:05

## 2018-07-22 NOTE — PROVIDER CONTACT NOTE (OTHER) - SITUATION
notifed dr spence that aptt was not drawn today. pt is on heparin drip. he ordered stat aptt at 1549. called multiple times to come and draw stat lab since the lab techs will not draw stat lab.

## 2018-07-22 NOTE — PROVIDER CONTACT NOTE (OTHER) - SITUATION
MD made aware most recent labs just posted, current aPTT is 57.3  As per orders MD to be notified when PTT below normal limits. MD made aware most recent labs just posted, current aPTT is 57.3  As per orders MD to be notified when PTT below normal limits.   Next aPTT should be ordered and drawn around 1am, last lab drawn at 7p

## 2018-07-22 NOTE — PROGRESS NOTE ADULT - ASSESSMENT
72 year old man with a past medical history of HLD and Hernia presenting for incidental finding of portal vein thrombosis after result of elevated LFTs on regular blood work.    Hepatocellular Carcinoma with Liver cirrhosis and Right intrahepatic and distal extrahepatic portal vein thrombosis + metastasis to left hepatic lobe  - aappreciate gastroenterology consult   - awaiting oncology consult   will cw heparin gtt until clarified by gi/radiology regarding ac   Hyperkalemia- repeat bmp , likely hemolyzed     HLD; statin discontinued due to hepatotoxicity. Evaluate drug of choice for lipid lowering agent. Resume outpatient ASA 81.  DVT ppx; on Heparin drip.  Diet; DASH, low sodium, fluid < 1.5L  Full code, patient wants treatment    SARA [place], and case dw PMD Dr Watson, and GI fellow 72 year old man with a past medical history of HLD and Hernia presenting for incidental finding of portal vein thrombosis after result of elevated LFTs on regular blood work.    Hepatocellular Carcinoma with Liver cirrhosis and Right intrahepatic and distal extrahepatic portal vein thrombosis + metastasis to left hepatic lobe  - aappreciate gastroenterology consult   - awaiting oncology consult   will cw heparin gtt until clarified by gi/radiology regarding ac   Hyperkalemia- repeat bmp , likely hemolyzed   Lymphadenopathy - likely secondary to neoplasm   Splenomegaly  HLD; statin discontinued due to hepatotoxicity. Evaluate drug of choice for lipid lowering agent. Resume outpatient ASA 81.  DVT ppx; on Heparin drip.  Diet; DASH, low sodium, fluid < 1.5L  Full code, patient wants treatment    SARA [place], and case dw PMD Dr Watson, and GI fellow

## 2018-07-22 NOTE — PROVIDER CONTACT NOTE (OTHER) - ACTION/TREATMENT ORDERED:
MD Redmond in agreement. No further interventions. MD Redmond in agreement. No further interventions. MD will order aPTT to be drawn.

## 2018-07-22 NOTE — PROVIDER CONTACT NOTE (OTHER) - BACKGROUND
Pt is currently on IV Heparin drip, was sent from ED earlier with no IVF. Pt is eating and drinking with no issues.
pt on heparin drip for portal vein thrombosis

## 2018-07-22 NOTE — PROGRESS NOTE ADULT - SUBJECTIVE AND OBJECTIVE BOX
KAMLESHSANJAYDLGAMALIEL  72y  [id number]      Patient is a 72y old  Male who presents with a chief complaint of Incidental liver lesions, cirrhosis and portal vein thrombosis on outpatient US (21 Jul 2018 02:26)      INTERVAL HPI/OVERNIGHT EVENTS:    no acute events overngiht , complained of difficult sleeping     REVIEW OF SYSTEMS:  CONSTITUTIONAL: No fever, weight loss, or fatigue  EYES: No eye pain, visual disturbances, or discharge  ENMT:  No difficulty hearing, tinnitus, vertigo; No sinus or throat pain  NECK: No pain or stiffness  BREASTS: No pain, masses, or nipple discharge  RESPIRATORY: No cough, wheezing, chills or hemoptysis; No shortness of breath  CARDIOVASCULAR: No chest pain, palpitations, dizziness, or leg swelling  GASTROINTESTINAL: No abdominal or epigastric pain. No nausea, vomiting, or hematemesis; No diarrhea or constipation. No melena or hematochezia.  GENITOURINARY: No dysuria, frequency, hematuria, or incontinence  NEUROLOGICAL: No headaches, memory loss, loss of strength, numbness, or tremors  SKIN: No itching, burning, rashes, or lesions   LYMPH NODES: No enlarged glands  ENDOCRINE: No heat or cold intolerance; No hair loss  MUSCULOSKELETAL: No joint pain or swelling; No muscle, back, or extremity pain  PSYCHIATRIC: No depression, + Insomnia last night   HEME/LYMPH: No easy bruising, or bleeding gums  ALLERY AND IMMUNOLOGIC: No hives or eczema  FAMILY HISTORY:  No pertinent family history in first degree relatives    T(C): 35.7 (07-22-18 @ 07:35), Max: 36.1 (07-21-18 @ 17:00)  HR: 113 (07-22-18 @ 07:35) (89 - 113)  BP: 116/73 (07-22-18 @ 07:35) (108/57 - 152/82)  RR: 18 (07-22-18 @ 07:35) (18 - 19)  SpO2: 95% (07-22-18 @ 00:00) (95% - 96%)  Wt(kg): --Vital Signs Last 24 Hrs  T(C): 35.7 (22 Jul 2018 07:35), Max: 36.1 (21 Jul 2018 17:00)  T(F): 96.3 (22 Jul 2018 07:35), Max: 96.9 (21 Jul 2018 17:00)  HR: 113 (22 Jul 2018 07:35) (89 - 113)  BP: 116/73 (22 Jul 2018 07:35) (108/57 - 152/82)  BP(mean): --  RR: 18 (22 Jul 2018 07:35) (18 - 19)  SpO2: 95% (22 Jul 2018 00:00) (95% - 96%)    PHYSICAL EXAM:  GENERAL: NAD, well-groomed, well-developed  HEAD:  Atraumatic, Normocephalic  EYES: EOMI, PERRLA, conjunctiva and sclera clear  ENMT: No tonsillar erythema, exudates, or enlargement; Moist mucous membranes, Good dentition, No lesions  NECK: Supple, No JVD, Normal thyroid  NERVOUS SYSTEM:  Alert & Oriented X3, Good concentration; Motor Strength 5/5 B/L upper and lower extremities; DTRs 2+ intact and symmetric  PULM: Clear to auscultation bilaterally  CARDIAC: Regular rate and rhythm; No murmurs, rubs, or gallops  GI: Soft, Nontender, distended , tympanic   EXTREMITIES:  2+ Peripheral Pulses, No clubbing, cyanosis, or edema  LYMPH: No lymphadenopathy noted  SKIN: No rashes or lesions    Consultant(s) Notes Reviewed:  [x ] YES  [ ] NO  Care Discussed with Consultants/Other Providers [ x] YES  [ ] NO    LABS:                            12.2   11.65 )-----------( 248      ( 22 Jul 2018 06:43 )             37.2   07-22    136  |  97<L>  |  21<H>  ----------------------------<  141<H>  6.0<HH>   |  24  |  1.2    Ca    9.2      22 Jul 2018 06:43  Phos  5.3     07-22  Mg     1.9     07-22    TPro  7.1  /  Alb  3.8  /  TBili  2.2<H>  /  DBili  0.7<H>  /  AST  143<H>  /  ALT  38  /  AlkPhos  319<H>  07-21            aspirin enteric coated 81 milliGRAM(s) Oral daily  heparin  Infusion. 1100 Unit(s)/Hr IV Continuous <Continuous>      HEALTH ISSUES - PROBLEM Dx:          Case Discussed with House Staff   45 minutes spent on total encounter; more than 50% of the visit was spent counseling and/or coordinating care by the attending physician.

## 2018-07-22 NOTE — PROVIDER CONTACT NOTE (OTHER) - RECOMMENDATIONS
According to Heparin Nomogram, current Heparin gtt of 11ml/h was increased by 2 to 13ml/h According to Heparin Nomogram, current Heparin gtt of 11ml/h was increased by 2 to 13ml/h. Pt needs a f/u aPTT to be ordered. No future blood work ordered at this time. aPTT should be drawn q6h.

## 2018-07-23 DIAGNOSIS — R16.0 HEPATOMEGALY, NOT ELSEWHERE CLASSIFIED: ICD-10-CM

## 2018-07-23 DIAGNOSIS — I81 PORTAL VEIN THROMBOSIS: ICD-10-CM

## 2018-07-23 DIAGNOSIS — K74.60 UNSPECIFIED CIRRHOSIS OF LIVER: ICD-10-CM

## 2018-07-23 LAB
ALBUMIN SERPL ELPH-MCNC: 3.1 G/DL — LOW (ref 3.5–5.2)
ALP SERPL-CCNC: 300 U/L — HIGH (ref 30–115)
ALT FLD-CCNC: 75 U/L — HIGH (ref 0–41)
ANION GAP SERPL CALC-SCNC: 15 MMOL/L — HIGH (ref 7–14)
APTT BLD: 40.9 SEC — HIGH (ref 27–39.2)
APTT BLD: 86.2 SEC — CRITICAL HIGH (ref 27–39.2)
AST SERPL-CCNC: 393 U/L — HIGH (ref 0–41)
BILIRUB DIRECT SERPL-MCNC: 0.7 MG/DL — HIGH (ref 0–0.2)
BILIRUB INDIRECT FLD-MCNC: 1 MG/DL — SIGNIFICANT CHANGE UP (ref 0.2–1.2)
BILIRUB SERPL-MCNC: 1.7 MG/DL — HIGH (ref 0.2–1.2)
BLD GP AB SCN SERPL QL: SIGNIFICANT CHANGE UP
BUN SERPL-MCNC: 36 MG/DL — HIGH (ref 10–20)
CALCIUM SERPL-MCNC: 8.3 MG/DL — LOW (ref 8.5–10.1)
CHLORIDE SERPL-SCNC: 93 MMOL/L — LOW (ref 98–110)
CO2 SERPL-SCNC: 21 MMOL/L — SIGNIFICANT CHANGE UP (ref 17–32)
CREAT SERPL-MCNC: 1.4 MG/DL — SIGNIFICANT CHANGE UP (ref 0.7–1.5)
GLUCOSE SERPL-MCNC: 133 MG/DL — HIGH (ref 70–99)
HCT VFR BLD CALC: 29.6 % — LOW (ref 42–52)
HCT VFR BLD CALC: 29.7 % — LOW (ref 42–52)
HCT VFR BLD CALC: 30.3 % — LOW (ref 42–52)
HGB BLD-MCNC: 9.8 G/DL — LOW (ref 14–18)
HGB BLD-MCNC: 9.9 G/DL — LOW (ref 14–18)
HGB BLD-MCNC: 9.9 G/DL — LOW (ref 14–18)
LACTATE SERPL-SCNC: 2.8 MMOL/L — HIGH (ref 0.5–2.2)
MAGNESIUM SERPL-MCNC: 1.9 MG/DL — SIGNIFICANT CHANGE UP (ref 1.8–2.4)
MCHC RBC-ENTMCNC: 29.7 PG — SIGNIFICANT CHANGE UP (ref 27–31)
MCHC RBC-ENTMCNC: 30.3 PG — SIGNIFICANT CHANGE UP (ref 27–31)
MCHC RBC-ENTMCNC: 30.5 PG — SIGNIFICANT CHANGE UP (ref 27–31)
MCHC RBC-ENTMCNC: 32.7 G/DL — SIGNIFICANT CHANGE UP (ref 32–37)
MCHC RBC-ENTMCNC: 33 G/DL — SIGNIFICANT CHANGE UP (ref 32–37)
MCHC RBC-ENTMCNC: 33.4 G/DL — SIGNIFICANT CHANGE UP (ref 32–37)
MCV RBC AUTO: 91 FL — SIGNIFICANT CHANGE UP (ref 80–94)
MCV RBC AUTO: 91.1 FL — SIGNIFICANT CHANGE UP (ref 80–94)
MCV RBC AUTO: 92 FL — SIGNIFICANT CHANGE UP (ref 80–94)
NRBC # BLD: 0 /100 WBCS — SIGNIFICANT CHANGE UP (ref 0–0)
PHOSPHATE SERPL-MCNC: 4 MG/DL — SIGNIFICANT CHANGE UP (ref 2.1–4.9)
PLATELET # BLD AUTO: 192 K/UL — SIGNIFICANT CHANGE UP (ref 130–400)
PLATELET # BLD AUTO: 206 K/UL — SIGNIFICANT CHANGE UP (ref 130–400)
PLATELET # BLD AUTO: 224 K/UL — SIGNIFICANT CHANGE UP (ref 130–400)
POTASSIUM SERPL-MCNC: 4.5 MMOL/L — SIGNIFICANT CHANGE UP (ref 3.5–5)
POTASSIUM SERPL-SCNC: 4.5 MMOL/L — SIGNIFICANT CHANGE UP (ref 3.5–5)
PROT SERPL-MCNC: 6 G/DL — SIGNIFICANT CHANGE UP (ref 6–8)
RBC # BLD: 3.23 M/UL — LOW (ref 4.7–6.1)
RBC # BLD: 3.25 M/UL — LOW (ref 4.7–6.1)
RBC # BLD: 3.33 M/UL — LOW (ref 4.7–6.1)
RBC # FLD: 14.5 % — SIGNIFICANT CHANGE UP (ref 11.5–14.5)
RBC # FLD: 14.6 % — HIGH (ref 11.5–14.5)
RBC # FLD: 14.6 % — HIGH (ref 11.5–14.5)
SODIUM SERPL-SCNC: 129 MMOL/L — LOW (ref 135–146)
TYPE + AB SCN PNL BLD: SIGNIFICANT CHANGE UP
WBC # BLD: 14.17 K/UL — HIGH (ref 4.8–10.8)
WBC # BLD: 14.3 K/UL — HIGH (ref 4.8–10.8)
WBC # BLD: 16.44 K/UL — HIGH (ref 4.8–10.8)
WBC # FLD AUTO: 14.17 K/UL — HIGH (ref 4.8–10.8)
WBC # FLD AUTO: 14.3 K/UL — HIGH (ref 4.8–10.8)
WBC # FLD AUTO: 16.44 K/UL — HIGH (ref 4.8–10.8)

## 2018-07-23 PROCEDURE — 99223 1ST HOSP IP/OBS HIGH 75: CPT

## 2018-07-23 RX ORDER — SODIUM CHLORIDE 9 MG/ML
1000 INJECTION INTRAMUSCULAR; INTRAVENOUS; SUBCUTANEOUS
Qty: 0 | Refills: 0 | Status: DISCONTINUED | OUTPATIENT
Start: 2018-07-23 | End: 2018-07-23

## 2018-07-23 RX ORDER — ONDANSETRON 8 MG/1
4 TABLET, FILM COATED ORAL EVERY 6 HOURS
Qty: 0 | Refills: 0 | Status: DISCONTINUED | OUTPATIENT
Start: 2018-07-23 | End: 2018-07-26

## 2018-07-23 RX ORDER — SODIUM CHLORIDE 9 MG/ML
1000 INJECTION INTRAMUSCULAR; INTRAVENOUS; SUBCUTANEOUS
Qty: 0 | Refills: 0 | Status: DISCONTINUED | OUTPATIENT
Start: 2018-07-23 | End: 2018-07-25

## 2018-07-23 RX ADMIN — HEPARIN SODIUM 1300 UNIT(S)/HR: 5000 INJECTION INTRAVENOUS; SUBCUTANEOUS at 05:43

## 2018-07-23 RX ADMIN — SODIUM CHLORIDE 75 MILLILITER(S): 9 INJECTION INTRAMUSCULAR; INTRAVENOUS; SUBCUTANEOUS at 18:10

## 2018-07-23 RX ADMIN — SODIUM CHLORIDE 125 MILLILITER(S): 9 INJECTION INTRAMUSCULAR; INTRAVENOUS; SUBCUTANEOUS at 10:57

## 2018-07-23 RX ADMIN — SODIUM CHLORIDE 75 MILLILITER(S): 9 INJECTION INTRAMUSCULAR; INTRAVENOUS; SUBCUTANEOUS at 09:46

## 2018-07-23 NOTE — CONSULT NOTE ADULT - ASSESSMENT
ASSESSMENT:  72y Male with newly diagnosed liver mass with smaller lesions extending to L liver on CT/US, elevated AFP, ; Likely HCC, Based on current imaging, liver mass considered unresectable and patient will benefit from IR procedure, TACE.     PLAN:   C/W GI w/u, EGD, Colonoscopy  C/W current w/u, labs  F/U hepatic protocol CT A/P  F/U IR for chemoembolization of liver lesion  Consider palliative consult, establish goals of care with patient  Consider monitored setting if patient continues to be tachy    Above plan discussed with Dr. Swanson and medical team    --------------------------------------------------------------------------------------    07-23-18 @ 19:36

## 2018-07-23 NOTE — PROGRESS NOTE ADULT - ASSESSMENT
72 year old man with a past medical history of HLD and Hernia presents for incidental finding of portal vein thrombosis after result of elevated LFTs on regular blood work.  Pt also reports abdominal pain and 20lb weight loss.     1. Liver masses with markedly elevated alpha fetoprotein level - likely hepatocellular carcinoma  will need biopsy when stable  MRI of liver  GI f/u  HemeOnc eval in progress  f/u pending labs  overall very guarded prognosis  full code status    2. Portal vein thrombosis vs tumor invasion  GI and Vascular surgery f/u  anticoagulation stopped today because of possible hemoperitoneum  Surgery eval  monitor H/H    3. PE of right lung - venous duplex is negative  off heparin IV due to possible hemoperitoneum  monitor respiratory status closely    4. Cirrhosis with portal hypertension  pt denies ETOH use per chart  hepatitis profile negative  check for other causes of cirrhosis as outlined in GI note

## 2018-07-23 NOTE — CONSULT NOTE ADULT - ASSESSMENT
73 y/o M with PMH of HLD presented after outpatient US found multiple liver masses, portal vein thrombosis, and evidence of cirrhosis.  Admitted for malignancy work-up and hem/onc consulted for recommendations regarding likely hepatocellular carcinoma.    1.) Hepatic malignancy: likely HCC    - Diffuse tumor infiltration through the right hepatic lobe and 2 masses in the left hepatic lobe.     - Portal vein thrombosis and R. pulmonary emboli noted on CT as well. No DVT on US    - AFP is 14,800    - CA 19-9 is 58.    - Patient will need biopsy for tissue diagnosis to determine treatment options, however, biopsy not possible at this time due to possible bleed (see problem 2).    - Consult surgery or IR for liver biopsy once bleeding has resolved.    - SCDs for DVT prophylaxis.    2.) Possible hemoperitoneum on CT chest: secondary to anticoagulation with heparin. Heparin drip has been discontinued.    - Hgb has dropped from 12.2 to 9.9, but remains hemod    - Discontinue heparin drip.     - Monitor CBC every 12 hours.    - Avoid anticoagulation. 71 y/o M with PMH of HLD presented after outpatient US found multiple liver masses, portal vein thrombosis, and evidence of cirrhosis.  Admitted for malignancy work-up and hem/onc consulted for recommendations regarding likely hepatocellular carcinoma.    1.) Hepatic malignancy: likely HCC    - Diffuse tumor infiltration through the right hepatic lobe and 2 masses in the left hepatic lobe.     - Portal vein thrombosis and R. pulmonary emboli noted on CT as well. No DVT on US.    - AFP is 14,800    - CA 19-9 is 58.    - Patient will need biopsy for tissue diagnosis to determine treatment options, however, biopsy not possible at this time due to likely acute bleed (see problem 2).    - Consult surgery or IR for liver biopsy once bleeding has resolved.    - SCDs for DVT prophylaxis.    2.) Possible hemoperitoneum on CT chest: Likely due to liver masses and therapeutic anticoagulation with heparin. Heparin drip has been discontinued.    - Hgb has dropped from 12.2 to 9.9, but remains hemodynamically stable.    - Discontinue heparin drip.     - Monitor CBC every 12 hours.    - Avoid anticoagulation.

## 2018-07-23 NOTE — PROGRESS NOTE ADULT - SUBJECTIVE AND OBJECTIVE BOX
Chief Complaint:  Patient is a 72y old  Male who presents with a chief complaint of Incidental liver lesions, cirrhosis and portal vein thrombosis on outpatient US (21 Jul 2018 02:26)    72 year old man with a past medical history of HLD and Hernia was getting blood work for hernia surgery found to have increase lever enzymes , sent for us found to have cirrhosis. Patient been complaining of abdominal bloating for about a week, with no pain, associated with non bloody diarrhea , 6 episodes daily, with mucous, not related to food and wakes him up at night. Patient denies any hx of GI bleed in the past. His father has a liver cirrhosis secondary to alcohol use  He denies drinking alcohol or smoking   Denies any hx of liver problem or hepatitis or prior blood transfusions.    Interval Events:   Pt reports his abdominal bloating and diarrhea improved.  Pt denies hematemesis coffee ground emesis melena, BRBPR.        Allergies:  No Known Allergies      Home Medications:    Hospital Medications:  sodium chloride 0.9%. 1000 milliLiter(s) IV Continuous <Continuous>      PMHX/PSHX:  Incarcerated right inguinal hernia  CAD (coronary artery disease)  Hypercholesterolemia  Hernia  High cholesterol  CAD S/P percutaneous coronary angioplasty      Family history:  No pertinent family history in first degree relatives      ROS:     General:  No wt loss, fevers, chills, night sweats, fatigue,   Eyes:  Good vision, no reported pain  ENT:  No sore throat, pain, runny nose, dysphagia  CV:  No pain, palpitations, hypo/hypertension  Resp:  No dyspnea, cough, tachypnea, wheezing  GI:  No pain, No nausea, No vomiting, No diarrhea, No constipation, No weight loss, No fever, No pruritis, No rectal bleeding, No tarry stools, No dysphagia,  :  No pain, bleeding, incontinence, nocturia  Muscle:  No pain, weakness  Neuro:  No weakness, tingling, memory problems  Psych:  No fatigue, insomnia, mood problems, depression  Endocrine:  No polyuria, polydipsia, cold/heat intolerance  Heme:  No petechiae, ecchymosis, easy bruisability  Skin:  No rash, tattoos, scars, edema      PHYSICAL EXAM:   Vital Signs:  Vital Signs Last 24 Hrs  T(C): 37.1 (23 Jul 2018 00:00), Max: 37.1 (23 Jul 2018 00:00)  T(F): 98.7 (23 Jul 2018 00:00), Max: 98.7 (23 Jul 2018 00:00)  HR: 101 (23 Jul 2018 00:00) (101 - 122)  BP: 138/67 (23 Jul 2018 00:00) (120/77 - 138/67)  BP(mean): --  RR: 18 (23 Jul 2018 00:00) (18 - 18)  SpO2: --  Daily     Daily     GENERAL:  Appears stated age, well-groomed, well-nourished, no distress  HEENT:  NC/AT,  conjunctivae clear and pink, no thyromegaly, nodules, adenopathy, no JVD, sclera -anicteric  CHEST:  Full & symmetric excursion, no increased effort, breath sounds clear  HEART:  Regular rhythm, S1, S2, no murmur/rub/S3/S4, no abdominal bruit, no edema  ABDOMEN:  Soft, non-tender, non-distended, normoactive bowel sounds,    EXTEREMITIES:  no cyanosis,clubbing or edema  SKIN:  No rash/erythema/ecchymoses/petechiae/wounds/abscess/warm/dry  NEURO:  Alert, oriented, no asterixis, no tremor, no encephalopathy    LABS:                        9.9    16.44 )-----------( 224      ( 23 Jul 2018 05:07 )             29.6     07-22    136  |  97<L>  |  21<H>  ----------------------------<  141<H>  6.0<HH>   |  24  |  1.2    Ca    9.2      22 Jul 2018 06:43  Phos  5.3     07-22  Mg     1.9     07-22        PTT - ( 23 Jul 2018 03:59 )  PTT:86.2 sec        Imaging:      < from: CT Angio Abdomen and Pelvis w/ IV Cont (07.20.18 @ 22:35) >  HEPATOBILIARY: Liver cirrhosis. Diffuse heterogeneous arterial   hyperenhancement with portal venous washout throughout the right hepatic   lobe, up to 15 cm, with thrombosis of the right intrahepatic and distal   extrahepatic portal veins. Attenuated, but patent left intrahepatic   portal vein. Additional, at least two hypovascular masses within the left   hepatic lobe, measuring up to 1.2 cm at segment II. Partially exophytic 6   cm component off right inferior hepatic tip of tumor.    Cholelithiasis.    SPLEEN: Splenomegaly, 16.4 cm CC.    < end of copied text >

## 2018-07-23 NOTE — PROGRESS NOTE ADULT - SUBJECTIVE AND OBJECTIVE BOX
SUBJECTIVE:    Patient is a 72y old Male who presents with a chief complaint of Incidental liver lesions, cirrhosis and portal vein thrombosis on outpatient US (21 Jul 2018 02:26)    Currently admitted to medicine with the primary diagnosis of Portal vein thrombosis secondary to HCC invasion     Today is hospital day 2d. This morning he is resting comfortably in bed and reports no new issues or overnight events.     PAST MEDICAL & SURGICAL HISTORY  Incarcerated right inguinal hernia  CAD (coronary artery disease)  Hypercholesterolemia  CAD S/P percutaneous coronary angioplasty: no stents    SOCIAL HISTORY:  Negative for smoking/alcohol/drug use.     ALLERGIES:  No Known Allergies    MEDICATIONS:  STANDING MEDICATIONS  sodium chloride 0.9%. 1000 milliLiter(s) IV Continuous <Continuous>    PRN MEDICATIONS    VITALS:   T(F): 98.7  HR: 101  BP: 138/67  RR: 18  SpO2: --    LABS:                        9.9    14.30 )-----------( 192      ( 23 Jul 2018 10:15 )             30.3     07-23    129<L>  |  93<L>  |  36<H>  ----------------------------<  133<H>  4.5   |  21  |  1.4    Ca    8.3<L>      23 Jul 2018 10:15  Phos  4.0     07-23  Mg     1.9     07-23    TPro  6.0  /  Alb  3.1<L>  /  TBili  1.7<H>  /  DBili  0.7<H>  /  AST  393<H>  /  ALT  75<H>  /  AlkPhos  300<H>  07-23  < from: [facility] Duplex Lower Ext Vein Scan, Bilat (07.21.18 @ 10:07) >  Impression:    No evidence of deep venous thrombosis or superficial thrombophlebitis in   bilateral lower extremities.    < end of copied text >    PTT - ( 23 Jul 2018 10:15 )  PTT:40.9 sec    RADIOLOGY:  < from: CT Chest w/ IV Cont (07.22.18 @ 10:11) >    IMPRESSION:      1. Increased upper abdominal ascites with areas of new hyperdensity   concerning for hemorrhage.  2. Segmental and subsegmental or right pulmonary emboli.  3. Bilateral solid and groundglass nodules, some of which have been   stable since 2012.    < end of copied text >    < from: CT Angio Abdomen and Pelvis w/ IV Cont (07.20.18 @ 22:35) >  IMPRESSION:    1. Findings consistent with diffuse tumor infiltration, likely HCC,   throughout right hepatic lobe up to 15 cm with of the right intrahepatic   and distal extrahepatic portal veins; likely tumor thrombosis.   Additional, at least two hypovascular masses within the left hepatic   lobe, measuring up to 1.2 cm at segment II, suggesting metastatic HCC.    2. Liver cirrhosis with portal hypertension.    3. Nonspecific enlarged portacaval lymph nodes measuring up to 2.1 x 1.7   cm.      < from: [facility] Duplex Lower Ext Vein Scan, Bilat (07.21.18 @ 10:07) >  mpression:    No evidence of deep venous thrombosis or superficial thrombophlebitis in   bilateral lower extremities.              PHYSICAL EXAM:  GEN: No acute distress  LUNGS: Clear to auscultation bilaterally   HEART: S1/S2 present. RRR.   ABD: Soft, non-tender, non-distended. Bowel sounds present  EXT: NC/NC/NE/2+PP/LOPES/Skin Intact.   NEURO: AAOX3    Intravenous access:   NG tube:   Gomez cathter: SUBJECTIVE:    Patient is a 72y old Male who presents with a chief complaint of Incidental liver lesions, cirrhosis and portal vein thrombosis on outpatient US (21 Jul 2018 02:26)    Currently admitted to medicine with the primary diagnosis of Portal vein thrombosis secondary to 15.cm HCC invasion, likely tumor Thrombus    Today is hospital day 2d. This morning he is resting comfortably in bed and reports feeling nausea, weakness,  multiple episodes of non bloody diarrhea over night, and some night sweats over night.     This morning there was a call from Radiology with a critical finding about R segmental and subsegmental PE's as well as increased in complex acities concering for hemoperitonium. Heparin GGT was stopped as well as all anticoagulation and GI recommended Non contrast CT abdomen. Stat repeat CBC, CMP and EKG were ordered. Patient is doing ok considering. Will monitor vital signs Q3-4hrs as he was Tachycardic in the 120's this morning. Denies CP, SOB, palpitations, emesis, headache.     PAST MEDICAL & SURGICAL HISTORY  Incarcerated right inguinal hernia  CAD (coronary artery disease)  Hypercholesterolemia  CAD S/P percutaneous coronary angioplasty: no stents    ALLERGIES:  No Known Allergies    MEDICATIONS:  STANDING MEDICATIONS  sodium chloride 0.9%. 1000 milliLiter(s) IV Continuous <Continuous>    PRN MEDICATIONS    VITALS:   T(F): 98.7  HR: 101  BP: 138/67  RR: 18  SpO2: --    LABS:                        9.9    14.30 )-----------( 192      ( 23 Jul 2018 10:15 )             30.3     07-23    129<L>  |  93<L>  |  36<H>  ----------------------------<  133<H>  4.5   |  21  |  1.4    Ca    8.3<L>      23 Jul 2018 10:15  Phos  4.0     07-23  Mg     1.9     07-23    TPro  6.0  /  Alb  3.1<L>  /  TBili  1.7<H>  /  DBili  0.7<H>  /  AST  393<H>  /  ALT  75<H>  /  AlkPhos  300<H>  07-23  < from: [facility] Duplex Lower Ext Vein Scan, Bilat (07.21.18 @ 10:07) >  Impression:    No evidence of deep venous thrombosis or superficial thrombophlebitis in   bilateral lower extremities.    < end of copied text >    PTT - ( 23 Jul 2018 10:15 )  PTT:40.9 sec    RADIOLOGY:  < from: CT Chest w/ IV Cont (07.22.18 @ 10:11) >    IMPRESSION:      1. Increased upper abdominal ascites with areas of new hyperdensity   concerning for hemorrhage.  2. Segmental and subsegmental or right pulmonary emboli.  3. Bilateral solid and groundglass nodules, some of which have been   stable since 2012.    < end of copied text >    < from: CT Angio Abdomen and Pelvis w/ IV Cont (07.20.18 @ 22:35) >  IMPRESSION:    1. Findings consistent with diffuse tumor infiltration, likely HCC,   throughout right hepatic lobe up to 15 cm with of the right intrahepatic   and distal extrahepatic portal veins; likely tumor thrombosis.   Additional, at least two hypovascular masses within the left hepatic   lobe, measuring up to 1.2 cm at segment II, suggesting metastatic HCC.    2. Liver cirrhosis with portal hypertension.    3. Nonspecific enlarged portacaval lymph nodes measuring up to 2.1 x 1.7   cm.      < from: [facility] Duplex Lower Ext Vein Scan, Bilat (07.21.18 @ 10:07) >  mpression:    No evidence of deep venous thrombosis or superficial thrombophlebitis in   bilateral lower extremities.      PHYSICAL EXAM:  GEN: No acute distress  LUNGS: Clear to auscultation bilaterally   HEART: S1/S2 present. RRR.   ABD: Soft, distended but non tender, hyperactive BS  EXT: NC/NC/NE/2+PP/LOPES/Skin Intact.   NEURO: AAOX3    Intravenous access:  B/L SUBJECTIVE:    Patient is a 72y old Male who presents with a chief complaint of Incidental liver lesions, cirrhosis and portal vein thrombosis on outpatient US (21 Jul 2018 02:26)    Currently admitted to medicine with the primary diagnosis of Portal vein thrombosis secondary to 15.cm HCC invasion, likely tumor Thrombus    Today is hospital day 2d. This morning he is resting comfortably in bed and reports feeling nausea, weakness, multiple episodes of non bloody diarrhea over night, and some night sweats over night.     This morning there was a call from Radiology with a critical finding about R segmental and subsegmental PE's as well as increased in complex acities around liver concerning for hemoperitonium. Heparin GGT was stopped as well as all anticoagulation and GI recommended CT abdomen w/ GI bleed protocol per radiology. Stat repeat CBC, CMP and EKG were ordered. Patient is doing ok considering. Hb stable at 9.9 unchanged today but dropped from 14.1 on admission. Will monitor vital signs Q3-4hrs as he was Tachycardic in the 120's this morning, improved to low 100's with fluids. Denies CP, SOB, palpitations, emesis, headache, abdominal pain, just feels bloated.     PAST MEDICAL & SURGICAL HISTORY  Incarcerated right inguinal hernia  CAD (coronary artery disease)  Hypercholesterolemia  CAD S/P percutaneous coronary angioplasty: no stents    ALLERGIES:  No Known Allergies    MEDICATIONS:  STANDING MEDICATIONS  sodium chloride 0.9%. 1000 milliLiter(s) IV Continuous <Continuous>    PRN MEDICATIONS    VITALS:   T(F): 98.7  HR: 101  BP: 138/67  RR: 18  SpO2: --    LABS:                        9.9    14.30 )-----------( 192      ( 23 Jul 2018 10:15 )             30.3     07-23    129<L>  |  93<L>  |  36<H>  ----------------------------<  133<H>  4.5   |  21  |  1.4    Ca    8.3<L>      23 Jul 2018 10:15  Phos  4.0     07-23  Mg     1.9     07-23    TPro  6.0  /  Alb  3.1<L>  /  TBili  1.7<H>  /  DBili  0.7<H>  /  AST  393<H>  /  ALT  75<H>  /  AlkPhos  300<H>  07-23  < from: [facility] Duplex Lower Ext Vein Scan, Bilat (07.21.18 @ 10:07) >  Impression:    No evidence of deep venous thrombosis or superficial thrombophlebitis in   bilateral lower extremities.    < end of copied text >    PTT - ( 23 Jul 2018 10:15 )  PTT:40.9 sec    RADIOLOGY:  < from: CT Chest w/ IV Cont (07.22.18 @ 10:11) >    IMPRESSION:      1. Increased upper abdominal ascites with areas of new hyperdensity   concerning for hemorrhage.  2. Segmental and subsegmental or right pulmonary emboli.  3. Bilateral solid and groundglass nodules, some of which have been   stable since 2012.    < end of copied text >    < from: CT Angio Abdomen and Pelvis w/ IV Cont (07.20.18 @ 22:35) >  IMPRESSION:    1. Findings consistent with diffuse tumor infiltration, likely HCC,   throughout right hepatic lobe up to 15 cm with of the right intrahepatic   and distal extrahepatic portal veins; likely tumor thrombosis.   Additional, at least two hypovascular masses within the left hepatic   lobe, measuring up to 1.2 cm at segment II, suggesting metastatic HCC.    2. Liver cirrhosis with portal hypertension.    3. Nonspecific enlarged portacaval lymph nodes measuring up to 2.1 x 1.7   cm.      < from: [facility] Duplex Lower Ext Vein Scan, Bilat (07.21.18 @ 10:07) >  mpression:    No evidence of deep venous thrombosis or superficial thrombophlebitis in   bilateral lower extremities.      PHYSICAL EXAM:  GEN: No acute distress  LUNGS: Clear to auscultation bilaterally   HEART: S1/S2 present. RRR.   ABD: Soft, moderately distended but non tender, hyperactive BS  EXT: NC/NC/NE/2+PP/LOPES/Skin Intact.   NEURO: AAOX3    Intravenous access:  B/L

## 2018-07-23 NOTE — PROGRESS NOTE ADULT - ASSESSMENT
72 year old man with PMH of HLD and Hernia initially presented to the ED with elevated liver enzymes, found to have incidental cirrhosis and portal vein thrombosis on U/S. CT angio Abdomen findings consistent with HCC w possible mets; CT Abdomen on 7/23 showed increased upper abdominal ascites with areas of new hyperdensity concerning for hemorrhage in addition to segmental and subsegmental R pulmonary emboli. Due to drop in Hb and possible hemoperitoneum, anticoagulation was d/c; GI, Heme Onc is following.    Hepatic Malignancy: likely HCC  - Diffuse tumor infiltration through the right hepatic lobe and 2 masses in the left hepatic lobe.   - R. pulmonary emboli noted on CT. No DVT on US.  - Elevated AFP ([id number]), CA 19-9: 58  - Hepatitis profile negative  - follow [facility] work up ABBI, AMA, ASMA, SPEP, Ceruloplasmin, A1AT, ferritin, transferin saturation, ANTI LKM1  - As per Heme onc evaluation; a biopsy is needed for treatment options but not possible due to a possible hemoperitoneum  - Surgery and IR will be consulted once patient is stable      Possible Hemoperitoneum on CT: 2/2 to liver masses and Heparin drip (discontinued 7/23 am)  - Hgb dropped 12.2-9.9 (stable H:H on repeat labs)  - Hyperkalemia improved (6.0-->4.5)  - Lactate 2.8  - Pt was tachycardic, afebrile, stable bp  - Repeat EKG comparable to EKG on admission    Portal vein thrombosis vs tumor invasion  - GI and vascular sx f/u  - Heparin drip anticoagulation stopped  - Monitor H:H    PE of right lung  - venous duplex negative  - Tachycardic on EKG  - Heparin d/c due to hemoperitoneum  - c/w SCD for DVT prophylaxis   - monitor respiratory status closely 72 year old man with PMH of HLD and Hernia initially presented to the ED with elevated liver enzymes, found to have incidental cirrhosis and portal vein thrombosis 2/2 to tumor invasion per radiology on U/S. CT angio Abdomen findings consistent with HCC w possible mets; CT Abdomen on 7/23 showed increased upper abdominal ascites with areas of new hyperdensity concerning for hemorrhage in addition to segmental and subsegmental R pulmonary emboli. Due to drop in Hb and possible hemoperitoneum, anticoagulation was d/c; GI, Heme Onc, and Surgery is following.    Hepatic Malignancy: likely HCC  - Diffuse tumor infiltration through the right hepatic lobe and 2 masses in the left hepatic lobe.   - R. pulmonary emboli noted on CT. No DVT on US.  - Elevated AFP ([id number]), CA 19-9: 58  - Hepatitis profile negative  - follow [facility] work up ABBI, AMA, ASMA, SPEP, Ceruloplasmin, A1AT, ferritin, transferin saturation, ANTI LKM1  - As per Heme onc evaluation; a biopsy is needed for treatment options but not possible due to a possible hemoperitoneum  - Surgery and IR will be consulted once patient is stable      Possible Hemoperitoneum on CT: 2/2 to liver masses and Heparin drip (discontinued 7/23 am)  - Hgb dropped 12.2--->9.9 (stable H:H on repeat labs)  - Hyperkalemia improved (6.0-->4.5)  - Lactate 2.8  - Pt was tachycardic, afebrile, stable bp  - Repeat EKG comparable to EKG on admission    Portal vein thrombosis vs tumor invasion  - GI and vascular sx f/u  - Heparin drip anticoagulation stopped  - Monitor H:H    PE of right lung  - venous duplex negative  - Tachycardic on EKG  - Heparin d/c due to hemoperitoneum  - c/w SCD for DVT prophylaxis   - monitor respiratory status closely 72 year old man with PMH of HLD and Hernia initially presented to the ED with elevated liver enzymes, found to have incidental cirrhosis and portal vein thrombosis 2/2 to tumor invasion per radiology on U/S. CT angio Abdomen findings consistent with HCC w possible mets; CT Abdomen on 7/23 showed increased upper abdominal ascites with areas of new hyperdensity concerning for hemorrhage in addition to segmental and subsegmental R pulmonary emboli. Due to drop in Hb and possible hemoperitoneum, anticoagulation was d/c; GI, Heme Onc, and Surgery is following.    1. Hepatic Malignancy: likely HCC  - Diffuse tumor infiltration through the right hepatic lobe and 2 masses in the left hepatic lobe.   - R. pulmonary emboli noted on CT. No DVT on US.  - Elevated AFP ([id number]), CA 19-9: 58  - Hepatitis profile negative  - follow [facility] work up ABBI, AMA, ASMA, SPEP, Ceruloplasmin, A1AT, ferritin, transferin saturation, ANTI LKM1  - As per Heme onc evaluation; a biopsy is needed for treatment options but not possible due to a possible hemoperitoneum  - Surgery consult was placed      3. Hemoperitoneum on CT: 2/2 to liver masses and Heparin drip (discontinued 7/23 am)  - Hgb dropped 12.2--->9.9 (stable H:H on repeat labs)  - Hyperkalemia improved (6.0-->4.5)  - Lactate 2.8  - Pt was tachycardic, afebrile, stable bp  - Repeat EKG comparable to EKG on admission  - CT Abdomen and pelvis GI bleed protocol ordered STAT    Portal vein thrombosis vs tumor invasion  - GI and vascular sx f/u  - Heparin drip anticoagulation stopped  - Monitor H:H    4. PE of right lung  - LLE venous duplex negative  - Tachycardic on EKG  - Heparin d/c due to hemoperitoneum  - c/w SCD for DVT prophylaxis   - monitor respiratory status closely

## 2018-07-23 NOTE — PROGRESS NOTE ADULT - ASSESSMENT
72 year old man with a past medical history of HLD and Hernia was getting blood work for hernia surgery found to have increase Liver enzymes ,  found to have cirrhosis. Patient been complaining of abdominal bloating for about a week, with no pain, associated with non bloody diarrhea , 6 episodes daily, with mucous, not related to food and wakes him up at night.   Currently pt reports that his abd symptoms are improving.    Newly diagnosed cirrhosis of unclear etiology with hepatic tumor likely HCC with thrombus in portal vein 72 year old man with a past medical history of HLD and Hernia was getting blood work for hernia surgery found to have increase Liver enzymes ,  found to have cirrhosis. Patient been complaining of abdominal bloating for about a week, with no pain, associated with non bloody diarrhea , 6 episodes daily, with mucous, not related to food and wakes him up at night.   Currently pt reports that his abd symptoms are improving.    Newly diagnosed cirrhosis of unclear etiology with hepatic mass likely HCC with thrombus in portal vein    Liver cirrhosis   with portal hypertension feature of splenomegaly but no thrombocytopenia, ascites  No evidence of encephalopathy , coagulopathy  Etiology - unclear, denies alcohol abuse,  viral hepatitis panel negative check hep c PCR  Check [facility] work up ( ABBI, AMA, Anti SmA, cerruloplasmin 72 year old man with a past medical history of HLD and Hernia was getting blood work for hernia surgery found to have increase Liver enzymes ,  found to have cirrhosis. Patient been complaining of abdominal bloating for about a week, with no pain, associated with non bloody diarrhea , 6 episodes daily, with mucous, not related to food and wakes him up at night.   Currently pt reports that his abd symptoms are improving.    Newly diagnosed cirrhosis of unclear etiology with hepatic mass likely HCC with thrombus in portal vein    Liver cirrhosis   with portal hypertension feature of splenomegaly but no thrombocytopenia, ascites  No evidence of encephalopathy , coagulopathy  Etiology - unclear, denies alcohol abuse,  viral hepatitis panel negative check hep c PCR  Check [facility] work up ( ABBI, AMA, ASMA, SPEP, Ceruloplasmin, A1AT, ferritin, transferin saturation, ANTI LKM1)  needs EGD for screening for varices, specially if he is candidate for anticoagulation     Hepatic Mass with underlying liver cirrhosis and elevated AFP  likely HCC  MRI abdomen with liver protocol  Oncology eval     Portal vein thrombus - 72 year old man with a past medical history of HLD and Hernia was getting blood work for hernia surgery found to have increase Liver enzymes ,  found to have cirrhosis. Patient been complaining of abdominal bloating for about a week, with no pain, associated with non bloody diarrhea , 6 episodes daily, with mucous, not related to food and wakes him up at night.   Currently pt reports that his abd symptoms are improving.    Newly diagnosed cirrhosis of unclear etiology with hepatic mass likely HCC with thrombus in portal vein    Liver cirrhosis   with portal hypertension feature of splenomegaly but no thrombocytopenia, ascites  No evidence of encephalopathy , coagulopathy  Etiology - unclear, denies alcohol abuse,  viral hepatitis panel negative check hep c PCR  Check [facility] work up ( ABBI, AMA, ASMA, SPEP, Ceruloplasmin, A1AT, ferritin, transferin saturation, ANTI LKM1)  needs EGD for screening for varices, specially if he is candidate for anticoagulation     Hepatic Mass with underlying liver cirrhosis and elevated AFP  likely HCC  MRI abdomen with liver protocol  Oncology eval     Portal vein thrombus -  needs to be clarified with radiology whether it's a tumor invasion vs bland thrombus  Currently anticoagulated with IV heparin as per vascular    Acute anemia (Hb trended down from 14 to 9.9)  NO evidence of active GI bleed.     will discuss with attending. 72 year old man with a past medical history of HLD and Hernia was getting blood work for hernia surgery found to have increase Liver enzymes ,  found to have cirrhosis. Patient been complaining of abdominal bloating for about a week, with no pain, associated with non bloody diarrhea , 6 episodes daily, with mucous, not related to food and wakes him up at night.   Currently pt reports that his abd symptoms are improving.    Newly diagnosed cirrhosis of unclear etiology with hepatic mass likely HCC with thrombus in portal vein    Liver cirrhosis   with portal hypertension feature of splenomegaly but no thrombocytopenia, ascites  No evidence of encephalopathy , coagulopathy  Etiology - unclear, denies alcohol abuse,  viral hepatitis panel negative check hep c PCR  Check [facility] work up ( ABBI, AMA, ASMA, SPEP, Ceruloplasmin, A1AT, ferritin, transferin saturation, ANTI LKM1)  needs EGD for screening for varices, specially if he is candidate for anticoagulation     Hepatic Mass with underlying liver cirrhosis and elevated AFP  likely HCC  MRI abdomen with liver protocol  Oncology eval     Portal vein thrombus -  needs to be clarified with radiology whether it's a tumor invasion vs bland thrombus  Currently anticoagulated with IV heparin as per vascular    Acute anemia (Hb trended down from 14 to 9.9)  NO evidence of active GI bleed.   rec CT abdomen to r/o intra abdominal bleed , retroperitoneal bleed  Hold off on anticoagulation for now    follow up with vascular regarding risks and benefits of anticoagulation.    Discussed with attending.

## 2018-07-23 NOTE — CONSULT NOTE ADULT - SUBJECTIVE AND OBJECTIVE BOX
History of Present Illness:    71 y/o M with PMH of HLD presented after outpatient US found multiple liver masses. The patient was having pre-op labs performed for hernia repair surgery when he was found to have incidentally elevated liver enzymes. His PMD then ordered a RUQ US, which found evidence of liver cirrhosis, multiple masses, and a portal vein thrombosis.     He denies any symptoms except abdominal bloating for approximately 2-3 weeks and a 20lb weight loss over ....   Also endorses hand tremors that started approximately 8 months ago.  Patient denies history of alcohol abuse or cigarette smoking. Denies any history of liver problems or hepatitis. No family history of cancer.     Past Medical History    Past Surgical History    Social History    Current Medications:  sodium chloride 0.9%. 1000 milliLiter(s) IV Continuous <Continuous>    Home Medications:  Aspir 81 oral delayed release tablet: 1 tab(s) orally once a day (20 Jul 2018 18:58)  Lipitor: 40 milligram(s) orally once a day (at bedtime) (21 Jul 2018 02:52)      Vital Signs:  T(F): 98.7 (07-23-18 @ 00:00), Max: 98.7 (07-23-18 @ 00:00)  HR: 101 (07-23-18 @ 00:00) (89 - 122)  BP: 138/67 (07-23-18 @ 00:00) (108/57 - 152/82)  RR: 18 (07-23-18 @ 00:00) (18 - 19)  SpO2: 95% (07-22-18 @ 00:00) (95% - 96%)    Physical Exam:      Labs and Imaging:    CBC Full  -  ( 23 Jul 2018 05:07 )  WBC Count : 16.44 K/uL  Hemoglobin : 9.9 g/dL  Hematocrit : 29.6 %  Platelet Count - Automated : 224 K/uL  Mean Cell Volume : 91.1 fL  Mean Cell Hemoglobin : 30.5 pg  Mean Cell Hemoglobin Concentration : 33.4 g/dL    Basic Metabolic Panel (07.22.18 @ 06:43)    Sodium, Serum: 136 mmol/L    Potassium, Serum: 6.0 (hemolyzed?)    Chloride, Serum: 97 mmol/L    Carbon Dioxide, Serum: 24 mmol/L    Anion Gap, Serum: 15 mmol/L    Blood Urea Nitrogen, Serum: 21 mg/dL    Creatinine, Serum: 1.2 mg/dL    Glucose, Serum: 141 mg/dL    Calcium, Total Serum: 9.2 mg/dL    eGFR if Non : 59: Interpretative comment    Acute Hepatitis Panel (07.21.18 @ 08:32)    Hepatitis C Virus Interpretation: Nonreact: Hepatitis C AB     Hepatitis C Virus S/CO Ratio: 0.22 S/CO    Hepatitis B Core IgM Antibody: Nonreact    Hepatitis B Surface Antigen: Nonreact    Hepatitis A IgM Antibody: Nonreact    Hepatic Panel:  TP: 7.1  Alb: 3.8  ALK: 319    ALT 38  TB: 2.2  DB: 0.7    LDH: 269    Tumor Markers:  AFP: 14,831  CEA: 1.2  CA 19-9: 58    Transthoracic Echocardiogram (07.21.18 @ 10:26):   1. LV Ejection Fraction by Gomez's Method with a biplane EF of 55 %.   2. Normal left ventricular size and wall thicknesses, with normal   systolic function.   3. Spectral Doppler shows impaired relaxation pattern of left   ventricular myocardial filling (Grade I diastolic dysfunction).   4. Mild tricuspid regurgitation.   5. Fibrocalcific AV disease with normal AV opening.    CT Head w/wo IV Cont (07.22.18 @ 10:11):  1.  Prominence of the posterior nasopharyngeal soft tissues to the left of midline with blockage of the left fossa Rosenmuller, neoplasm in this location should be excluded, correlation with physical examination is recommended.  2.  Cerebral and cerebellar atrophy.  3.  No enhancing lesions to suggest cerebral metastasis.    CT Chest w/ IV Cont (07.22.18 @ 10:11):  1. Increased upper abdominal ascites with areas of new hyperdensity concerning for hemorrhage.  2. Segmental and subsegmental or right pulmonary emboli.  3. Bilateral solid and groundglass nodules, some of which have been stable since 2012.    VA Duplex Lower Ext Vein Scan, Bilat (07.21.18 @ 10:07):  No evidence of deep venous thrombosis or superficial thrombophlebitis in bilateral lower extremities.    CT Angio Abdomen and Pelvis w/ IV Cont (07.20.18 @ 22:35):  1. Findings consistent with diffuse tumor infiltration, likely HCC, throughout right hepatic lobe up to 15 cm with of the right intrahepatic and distal extrahepatic portal veins; likely tumor thrombosis.   Additional, at least two hypovascular masses within the left hepatic lobe, measuring up to 1.2 cm at segment II, suggesting metastatic HCC.  2. Liver cirrhosis with portal hypertension.  3. Nonspecific enlarged portacaval lymph nodes measuring up to 2.1 x 1.7 cm. History of Present Illness:    73 y/o M with PMH of HLD presented after outpatient US found multiple liver masses. The patient was having pre-op labs performed for hernia repair surgery when he was found to have incidentally elevated liver enzymes. His PMD then ordered a RUQ US, which found evidence of liver cirrhosis, multiple masses, and a portal vein thrombosis.     He denies any symptoms outpatient except abdominal bloating for approximately 2-3 weeks associated with poor appetite and PO intake. He endorses a 20lb weight loss over the past 3 months. Denies any history of liver problems or hepatitis. No family history of cancer, but both his father and grandfather had liver cirrhosis (however, both were alcohol abusers).    Currently he feels fatigued and has trouble sitting up in the bed on his own.    Past Medical History:  Hyperlipidemia    Past Surgical History:  None    Social History:  30 pack year smoking history. Quit at age 44. No alcohol use or drinking history. No illicit drug use.    Current Medications:  sodium chloride 0.9%. 1000 milliLiter(s) IV Continuous <Continuous>    Home Medications:  Aspir 81 oral delayed release tablet: 1 tab(s) orally once a day (20 Jul 2018 18:58)  Lipitor: 40 milligram(s) orally once a day (at bedtime) (21 Jul 2018 02:52)    Vital Signs:  T(F): 98.7 (07-23-18 @ 00:00), Max: 98.7 (07-23-18 @ 00:00)  HR: 101 (07-23-18 @ 00:00) (89 - 122)  BP: 138/67 (07-23-18 @ 00:00) (108/57 - 152/82)  RR: 18 (07-23-18 @ 00:00) (18 - 19)  SpO2: 95% (07-22-18 @ 00:00) (95% - 96%)    Physical Exam:  General: Not in distress.   HEENT: Unremarkable oropharynx. PERRLA.  Cardio: Regular rate and rhythm, S1, S2, no murmur, rub, or gallop.  Pulm: Bilateral crackles at lung bases.  Abdomen: Soft, non-tender, mild distention. No hepatosplenomegaly to palpation.   Extremities: No cyanosis or edema bilaterally. No calf tenderness to palpation.  Neuro: A&O x3. No focal deficits.     Labs and Imaging:    CBC Full  -  ( 23 Jul 2018 05:07 )  WBC Count : 16.44 K/uL  Hemoglobin : 9.9 g/dL  Hematocrit : 29.6 %  Platelet Count - Automated : 224 K/uL  Mean Cell Volume : 91.1 fL  Mean Cell Hemoglobin : 30.5 pg  Mean Cell Hemoglobin Concentration : 33.4 g/dL    Basic Metabolic Panel (07.22.18 @ 06:43)    Sodium, Serum: 136 mmol/L    Potassium, Serum: 6.0 (hemolyzed?)    Chloride, Serum: 97 mmol/L    Carbon Dioxide, Serum: 24 mmol/L    Anion Gap, Serum: 15 mmol/L    Blood Urea Nitrogen, Serum: 21 mg/dL    Creatinine, Serum: 1.2 mg/dL    Glucose, Serum: 141 mg/dL    Calcium, Total Serum: 9.2 mg/dL    eGFR if Non : 59: Interpretative comment    Acute Hepatitis Panel (07.21.18 @ 08:32)    Hepatitis C Virus Interpretation: Nonreact: Hepatitis C AB     Hepatitis C Virus S/CO Ratio: 0.22 S/CO    Hepatitis B Core IgM Antibody: Nonreact    Hepatitis B Surface Antigen: Nonreact    Hepatitis A IgM Antibody: Nonreact    Hepatic Panel:  TP: 7.1  Alb: 3.8  ALK: 319    ALT 38  TB: 2.2  DB: 0.7    LDH: 269    Tumor Markers:  AFP: 14,831  CEA: 1.2  CA 19-9: 58    Transthoracic Echocardiogram (07.21.18 @ 10:26):   1. LV Ejection Fraction by Gomez's Method with a biplane EF of 55 %.   2. Normal left ventricular size and wall thicknesses, with normal   systolic function.   3. Spectral Doppler shows impaired relaxation pattern of left   ventricular myocardial filling (Grade I diastolic dysfunction).   4. Mild tricuspid regurgitation.   5. Fibrocalcific AV disease with normal AV opening.    CT Head w/wo IV Cont (07.22.18 @ 10:11):  1.  Prominence of the posterior nasopharyngeal soft tissues to the left of midline with blockage of the left fossa Rosenmuller, neoplasm in this location should be excluded, correlation with physical examination is recommended.  2.  Cerebral and cerebellar atrophy.  3.  No enhancing lesions to suggest cerebral metastasis.    CT Chest w/ IV Cont (07.22.18 @ 10:11):  1. Increased upper abdominal ascites with areas of new hyperdensity concerning for hemorrhage.  2. Segmental and subsegmental or right pulmonary emboli.  3. Bilateral solid and groundglass nodules, some of which have been stable since 2012.    VA Duplex Lower Ext Vein Scan, Bilat (07.21.18 @ 10:07):  No evidence of deep venous thrombosis or superficial thrombophlebitis in bilateral lower extremities.    CT Angio Abdomen and Pelvis w/ IV Cont (07.20.18 @ 22:35):  1. Findings consistent with diffuse tumor infiltration, likely HCC, throughout right hepatic lobe up to 15 cm with of the right intrahepatic and distal extrahepatic portal veins; likely tumor thrombosis. Additional, at least two hypovascular masses within the left hepatic lobe, measuring up to 1.2 cm at segment II, suggesting metastatic HCC.  2. Liver cirrhosis with portal hypertension.  3. Nonspecific enlarged portacaval lymph nodes measuring up to 2.1 x 1.7 cm.

## 2018-07-23 NOTE — PROGRESS NOTE ADULT - SUBJECTIVE AND OBJECTIVE BOX
GAMALIEL BOB  72y Male    INTERVAL HPI/OVERNIGHT EVENTS:    Pt c/o gas in his abdomen. Family at bedside.   No nausea or vomiting.   Case discussed with staff on rounds today.   heparin IV stopped today.    T(F): 98.7 (07-23-18 @ 00:00), Max: 98.7 (07-23-18 @ 00:00)  HR: 101 (07-23-18 @ 00:00) (101 - 122)  BP: 138/67 (07-23-18 @ 00:00) (120/77 - 138/67)  RR: 18 (07-23-18 @ 00:00) (18 - 18)  SpO2: --  I&O's Summary    22 Jul 2018 07:01  -  23 Jul 2018 07:00  --------------------------------------------------------  IN: 0 mL / OUT: 450 mL / NET: -450 mL      PHYSICAL EXAM:  GENERAL: NAD  HEAD:  Normocephalic  EYES:  conjunctiva and sclera clear  ENMT: Moist mucous membranes  NECK: Supple  NERVOUS SYSTEM:  Alert & Oriented X3, Good concentration  CHEST/LUNG: Clear to percussion bilaterally; No rales, rhonchi, wheezing  HEART: Regular rate and rhythm; No murmurs  ABDOMEN: Soft, Nontender, mildly distended, Bowel sounds present  EXTREMITIES:   No edema  SKIN: pale    Consultant(s) Notes Reviewed:  [x ] YES  [ ] NO  Care Discussed with Consultants/Other Providers [ x] YES  [ ] NO    MEDICATIONS  (STANDING):  sodium chloride 0.9%. 1000 milliLiter(s) (125 mL/Hr) IV Continuous <Continuous>    MEDICATIONS  (PRN):      LABS:                        9.9    14.30 )-----------( 192      ( 23 Jul 2018 10:15 )             30.3     07-23    129<L>  |  93<L>  |  36<H>  ----------------------------<  133<H>  4.5   |  21  |  1.4    Ca    8.3<L>      23 Jul 2018 10:15  Phos  4.0     07-23  Mg     1.9     07-23    TPro  6.0  /  Alb  3.1<L>  /  TBili  1.7<H>  /  DBili  0.7<H>  /  AST  393<H>  /  ALT  75<H>  /  AlkPhos  300<H>  07-23    PTT - ( 23 Jul 2018 10:15 )  PTT:40.9 sec    Alpha Fetoprotein - Tumor Marker (07.21.18 @ 08:32)    Alpha Fetoprotein - Tumor Marker: 29773.0: Test Repeated.  Method: Roche Electrochemilumenescence  Values obtained with different assay methods or kits cannot be  used interchangeably.  Results cannot be interpreted as absolute evidence of the presence  or absence of malignant disease.  AFP values are not interpretable in pregnant females. ng/mL    Carcinoembryonic Antigen (07.21.18 @ 08:32)    Carcinoembryonic Antigen: 1.2: METHOD: Roche EIA   The CEA assay should not be used as a cancer screening test. Serum CEA  concentrations should only be used in conjunction with   information available from the clinical evaluation of the patien and  from other diagnostic procedures.   CEA Normal Ranges   _________________   Non-smoker: less than 3.9 ng/mL       Smoker: less than 5.5 ng/mL ng/mL    Cancer Antigen, GI Ca 19-9 (07.21.18 @ 08:32)    Cancer Antigen, GI Ca 19-9: 58.0: METHOD: [employer] Chemiluminescent Immunoassay  Values obtained with different assay methods or kits cannot be used  interchangeably.  Results cannot be interpreted as absolute evidence of the presence or  absence of malignant disease. U/mL    Hepatitis B Surface Antibody (07.21.18 @ 08:32)    Hepatitis B Surface Antibody: Nonreact    Acute Hepatitis Panel (07.21.18 @ 08:32)    Hepatitis C Virus Interpretation: Nonreact: Hepatitis C AB  S/CO Ratio                        Interpretation  < 1.0                                     Non-Reactive  1.0 - 4.9                           Weakly-Reactive  > 5.0                                 Reactive  Non-Reactive: A person witha non-reactive HCV antibody result is  considered uninfected.  No further action is needed unless recent  infection is suspected.  In these cases, consider repeat testing later to  detect seroconversion..  Weakly-Reactive: HCV antibody test is abnormal, HCV RNA Qualitative test  will follow.  Reactive: HCV antibody test is abnormal, HCV RNA Qualitative test will  follow.  Note: HCV antibody testing is performed on the Abbott  system.    Hepatitis C Virus S/CO Ratio: 0.22 S/CO    Hepatitis B Core IgM Antibody: Nonreact    Hepatitis B Surface Antigen: Nonreact    Hepatitis A IgM Antibody: Nonreact      RADIOLOGY & ADDITIONAL TESTS:    Imaging or report Personally Reviewed:  [x ] YES  [ ] NO    < from: CT Angio Abdomen and Pelvis w/ IV Cont (07.20.18 @ 22:35) >  1. Findings consistent with diffuse tumor infiltration, likely HCC,   throughout right hepatic lobe up to 15 cm with of the right intrahepatic   and distal extrahepatic portal veins; likely tumor thrombosis.   Additional, at least two hypovascular masses within the left hepatic   lobe, measuring up to 1.2 cm at segment II, suggesting metastatic HCC.    2. Liver cirrhosis with portal hypertension.    3. Nonspecific enlarged portacaval lymph nodes measuring up to 2.1 x 1.7   cm.    < end of copied text >    < from: CT Chest w/ IV Cont (07.22.18 @ 10:11) >  IMPRESSION:      1. Increased upper abdominal ascites with areas of new hyperdensity   concerning for hemorrhage.  2. Segmental and subsegmental or right pulmonary emboli.  3. Bilateral solid and groundglass nodules, some of which have been   stable since 2012.    < end of copied text >    < from: CT Head w/wo IV Cont (07.22.18 @ 10:11) >  IMPRESSION:     1.  Prominence of the posterior nasopharyngeal soft tissues to the left   of midline with blockage of the left fossa Rosenmuller, neoplasm in this   location should be excluded, correlation with physical examination is   recommended.    2.  Cerebral and cerebellar atrophy.    3.  No enhancing lesions to suggest cerebral metastasis.    < end of copied text >    < from: VA Duplex Lower Ext Vein Scan, Bilat (07.21.18 @ 10:07) >  Impression:    No evidence of deep venous thrombosis or superficial thrombophlebitis in   bilateral lower extremities.    < end of copied text >        Case discussed with resident    Care discussed with pt/family

## 2018-07-23 NOTE — CONSULT NOTE ADULT - SUBJECTIVE AND OBJECTIVE BOX
Surgery Consultation Note  =====================================================    HPI:  72 year old man with a past medical history of HLD and Hernia presenting for incidental finding of portal vein thrombosis after result of elevated LFTs on regular blood work. Patient reports that he has been having abominal bloating for about 2-3 weeks with mild constipation. PMD did blood work which showed increased LFTs and send him to get an outpatient US which showed multiple masses on liver, unclear if mets or primary liver malignancy and also cirrhosis as well as main portal vein occlusion. No fevers, chills, nausea, vomiting, diarrhea, chest pain, SOB, lower extremity swelling/warmth. Patient reports a 20lb weight loss. Patient reports that he has occasional tremors when he tries to hold something that has started 8 months ago. (21 Jul 2018 02:26)    Multiple services have been consulted for work-up of liver mass, including GI, heme/onc, IR, vascular surgery. Patient was placed on Hep GTT for portal vein thrombosis, had CT angio chest that demonstrated R seg/sub seg PE, but also complex fluid ascites in upper abdomen and around liver. Hep GTT was therapeutic, was discontinued 2/2 suspected intraabdominal hemorrhage today, Hb dropped from 14 on admission to 9.9 but has been stable x2 over past 24h. Patient remains Tachy for most of past 24h 110-120, no chest pain, abdominal pain, but c/o SOB and was placed on O2NC 2/2 O2 Sat 88% on RA. No previous h/o cirrhosis as per patient, no h/o hepatitis, no h/o EtOH abuse, no h/o liver problems or abdominal surgery.     PAST MEDICAL & SURGICAL HISTORY:  Incarcerated right inguinal hernia  CAD (coronary artery disease)  Hypercholesterolemia  CAD S/P percutaneous coronary angioplasty: no stents    Home Meds: Home Medications:  Aspir 81 oral delayed release tablet: 1 tab(s) orally once a day (20 Jul 2018 18:58)  Lipitor: 40 milligram(s) orally once a day (at bedtime) (21 Jul 2018 02:52)    Allergies: Allergies  No Known Allergies  Intolerances      Soc:   Denies any h/o EtOH abuse  +past smoker, 30 PYH  Denies any IVDU  Advanced Directives: Presumed Full Code     ROS:    REVIEW OF SYSTEMS  [ x ] A ten-point review of systems was otherwise negative except as noted.    CURRENT MEDICATIONS:   --------------------------------------------------------------------------------------  Neurologic Medications  ondansetron    Tablet 4 milliGRAM(s) Oral every 6 hours PRN Nausea and/or Vomiting    Gastrointestinal Medications  sodium chloride 0.9%. 1000 milliLiter(s) IV Continuous <Continuous>  --------------------------------------------------------------------------------------  VITAL SIGNS, INS/OUTS (last 24 hours):  --------------------------------------------------------------------------------------  Vital Signs Last 24 Hrs  T(C): 37.2 (23 Jul 2018 16:26), Max: 37.2 (23 Jul 2018 16:26)  T(F): 98.9 (23 Jul 2018 16:26), Max: 98.9 (23 Jul 2018 16:26)  HR: 118 (23 Jul 2018 16:26) (101 - 118)  BP: 115/68 (23 Jul 2018 16:26) (115/68 - 138/67)  RR: 18 (23 Jul 2018 16:26) (18 - 18)  SpO2: 88% (23 Jul 2018 16:00) (88% - 88%)    I&O's Summary  22 Jul 2018 07:01  -  23 Jul 2018 07:00  --------------------------------------------------------  IN: 0 mL / OUT: 450 mL / NET: -450 mL  --------------------------------------------------------------------------------------  PHYSICAL EXAM    General: NAD, AAOx3, calm and cooperative  HEENT: NCAT, No cervical or SC LAD  Cardiac: Tachycardic 110-115bpm, S1, S2, no Murmurs, rubs or gallops  Respiratory: CTAB, normal respiratory effort, breath sounds equal BL, no wheeze, rhonchi or crackles  Abdomen: Soft, +distended, non-tender, +bowel sounds, no rebound/guarding, no masses palpated  Vascular: Pulses 2+ throughout, extremities well perfused  Skin: Warm/dry, normal color, no jaundice    LABS  --------------------------------------------------------------------------------------  Labs:  CAPILLARY BLOOD GLUCOSE                        9.8    14.17 )-----------( 206      ( 23 Jul 2018 18:13 )             29.7       07-23    129<L>  |  93<L>  |  36<H>  ----------------------------<  133<H>  4.5   |  21  |  1.4    Calcium, Total Serum: 8.3 mg/dL (07-23-18 @ 10:15)  Magnesium, Serum: 1.9 mg/dL (07-23-18 @ 10:15)  Phosphorus Level, Serum: 4.0 mg/dL (07-23-18 @ 10:15)    LFTs:             6.0  | 1.7  | 393      ------------------[300     ( 23 Jul 2018 10:15 )  3.1  | 0.7  | 75          Lipase:x      Amylase:x         Lactate, Blood: 2.8 mmol/L (07-23-18 @ 11:43)    Coags:    x      ----< x       ( 23 Jul 2018 10:15 )     40.9      Carcinoembryonic Antigen (07.21.18 @ 08:32)    Carcinoembryonic Antigen: 1.2: METHOD: Roche EIA   The CEA assay should not be used as a cancer screening test. Serum CEA  concentrations should only be used in conjunction with   information available from the clinical evaluation of the patien and  from other diagnostic procedures.   CEA Normal Ranges   _________________   Non-smoker: less than 3.9 ng/mL       Smoker: less than 5.5 ng/mL ng/mL    Cancer Antigen, GI Ca 19-9 (07.21.18 @ 08:32)    Cancer Antigen, GI Ca 19-9: 58.0: METHOD: [employer] Chemiluminescent Immunoassay  Values obtained with different assay methods or kits cannot be used  interchangeably.  Results cannot be interpreted as absolute evidence of the presence or  absence of malignant disease. U/mL    Alpha Fetoprotein - Tumor Marker: 89767.0: Test Repeated.  Method: Roche Electrochemilumenescence  Values obtained with different assay methods or kits cannot be  used interchangeably.  Results cannot be interpreted as absolute evidence of the presence  or absence of malignant disease.  AFP values are not interpretable in pregnant females. ng/mL (07.21.18 @ 08:32)    --------------------------------------------------------------------------------------  IMAGING RESULTS  < from: CT Head w/wo IV Cont (07.22.18 @ 10:11) >  IMPRESSION:   1.  Prominence of the posterior nasopharyngeal soft tissues to the left   of midline with blockage of the left fossa Rosenmuller, neoplasm in this   location should be excluded, correlation with physical examination is   recommended.  2.  Cerebral and cerebellar atrophy.  3.  No enhancing lesions to suggest cerebral metastasis.  < end of copied text >    < from: CT Chest w/ IV Cont (07.22.18 @ 10:11) >  IMPRESSION:    1. Increased upper abdominal ascites with areas of new hyperdensity   concerning for hemorrhage.  2. Segmental and subsegmental or right pulmonary emboli.  3. Bilateral solid and groundglass nodules, some of which have been   stable since 2012.  < end of copied text >    < from: VA Duplex Lower Ext Vein Scan, Bilat (07.21.18 @ 10:07) >  Impression:  No evidence of deep venous thrombosis or superficial thrombophlebitis in   bilateral lower extremities.  < end of copied text >    < from: CT Angio Abdomen and Pelvis w/ IV Cont (07.20.18 @ 22:35) >  IMPRESSION:  1. Findings consistent with diffuse tumor infiltration, likely HCC,   throughout right hepatic lobe up to 15 cm with of the right intrahepatic   and distal extrahepatic portal veins; likely tumor thrombosis.   Additional, at least two hypovascular masses within the left hepatic   lobe, measuring up to 1.2 cm at segment II, suggesting metastatic HCC.  2. Liver cirrhosis with portal hypertension.  3. Nonspecific enlarged portacaval lymph nodes measuring up to 2.1 x 1.7   cm.  < end of copied text >    < from: Xray Chest 1 View-PORTABLE IMMEDIATE (07.20.18 @ 18:49) >  Impression:    No radiographic evidence of acute cardiopulmonary disease.  < end of copied text >    ---------------------------------------------------------------------------------------

## 2018-07-24 LAB
ALBUMIN SERPL ELPH-MCNC: 2.8 G/DL — LOW (ref 3.5–5.2)
ALP SERPL-CCNC: 246 U/L — HIGH (ref 30–115)
ALT FLD-CCNC: 60 U/L — HIGH (ref 0–41)
ANION GAP SERPL CALC-SCNC: 14 MMOL/L — SIGNIFICANT CHANGE UP (ref 7–14)
AST SERPL-CCNC: 289 U/L — HIGH (ref 0–41)
BASOPHILS # BLD AUTO: 0.01 K/UL — SIGNIFICANT CHANGE UP (ref 0–0.2)
BASOPHILS NFR BLD AUTO: 0.1 % — SIGNIFICANT CHANGE UP (ref 0–1)
BILIRUB DIRECT SERPL-MCNC: 0.9 MG/DL — HIGH (ref 0–0.2)
BILIRUB INDIRECT FLD-MCNC: 1.2 MG/DL — SIGNIFICANT CHANGE UP (ref 0.2–1.2)
BILIRUB SERPL-MCNC: 2.1 MG/DL — HIGH (ref 0.2–1.2)
BLD GP AB SCN SERPL QL: SIGNIFICANT CHANGE UP
BUN SERPL-MCNC: 32 MG/DL — HIGH (ref 10–20)
CALCIUM SERPL-MCNC: 7.6 MG/DL — LOW (ref 8.5–10.1)
CHLORIDE SERPL-SCNC: 99 MMOL/L — SIGNIFICANT CHANGE UP (ref 98–110)
CO2 SERPL-SCNC: 21 MMOL/L — SIGNIFICANT CHANGE UP (ref 17–32)
CREAT SERPL-MCNC: 1.1 MG/DL — SIGNIFICANT CHANGE UP (ref 0.7–1.5)
EOSINOPHIL # BLD AUTO: 0.02 K/UL — SIGNIFICANT CHANGE UP (ref 0–0.7)
EOSINOPHIL NFR BLD AUTO: 0.2 % — SIGNIFICANT CHANGE UP (ref 0–8)
GLUCOSE SERPL-MCNC: 91 MG/DL — SIGNIFICANT CHANGE UP (ref 70–99)
HCT VFR BLD CALC: 27.2 % — LOW (ref 42–52)
HGB BLD-MCNC: 9 G/DL — LOW (ref 14–18)
IMM GRANULOCYTES NFR BLD AUTO: 0.7 % — HIGH (ref 0.1–0.3)
LYMPHOCYTES # BLD AUTO: 1.02 K/UL — LOW (ref 1.2–3.4)
LYMPHOCYTES # BLD AUTO: 10.5 % — LOW (ref 20.5–51.1)
MAGNESIUM SERPL-MCNC: 2 MG/DL — SIGNIFICANT CHANGE UP (ref 1.8–2.4)
MCHC RBC-ENTMCNC: 30.5 PG — SIGNIFICANT CHANGE UP (ref 27–31)
MCHC RBC-ENTMCNC: 33.1 G/DL — SIGNIFICANT CHANGE UP (ref 32–37)
MCV RBC AUTO: 92.2 FL — SIGNIFICANT CHANGE UP (ref 80–94)
MONOCYTES # BLD AUTO: 1.24 K/UL — HIGH (ref 0.1–0.6)
MONOCYTES NFR BLD AUTO: 12.7 % — HIGH (ref 1.7–9.3)
NEUTROPHILS # BLD AUTO: 7.39 K/UL — HIGH (ref 1.4–6.5)
NEUTROPHILS NFR BLD AUTO: 75.8 % — HIGH (ref 42.2–75.2)
NRBC # BLD: 0 /100 WBCS — SIGNIFICANT CHANGE UP (ref 0–0)
PLATELET # BLD AUTO: 136 K/UL — SIGNIFICANT CHANGE UP (ref 130–400)
POTASSIUM SERPL-MCNC: 4.2 MMOL/L — SIGNIFICANT CHANGE UP (ref 3.5–5)
POTASSIUM SERPL-SCNC: 4.2 MMOL/L — SIGNIFICANT CHANGE UP (ref 3.5–5)
PROT SERPL-MCNC: 5.3 G/DL — LOW (ref 6–8)
RBC # BLD: 2.95 M/UL — LOW (ref 4.7–6.1)
RBC # FLD: 14.6 % — HIGH (ref 11.5–14.5)
SODIUM SERPL-SCNC: 134 MMOL/L — LOW (ref 135–146)
TYPE + AB SCN PNL BLD: SIGNIFICANT CHANGE UP
WBC # BLD: 9.75 K/UL — SIGNIFICANT CHANGE UP (ref 4.8–10.8)
WBC # FLD AUTO: 9.75 K/UL — SIGNIFICANT CHANGE UP (ref 4.8–10.8)

## 2018-07-24 PROCEDURE — 99233 SBSQ HOSP IP/OBS HIGH 50: CPT

## 2018-07-24 RX ADMIN — SODIUM CHLORIDE 75 MILLILITER(S): 9 INJECTION INTRAMUSCULAR; INTRAVENOUS; SUBCUTANEOUS at 08:25

## 2018-07-24 NOTE — PROGRESS NOTE ADULT - SUBJECTIVE AND OBJECTIVE BOX
Progress Note: General Surgery  Patient: GAMALIEL BOB , 72y (1945)Male   MRN: [id number]  Location: [address]22 [address]  Visit: 07-21-18 Inpatient  Date: 07-24-18 @ 05:25  Hospital Day: 5    Procedure/Diagnosis: Left lobe liver mass  Events over 24h:     72 year old man with a past medical history of HLD and Hernia presenting for incidental finding of portal vein thrombosis after result of elevated LFTs on regular blood work. Patient reports that he has been having abdominal bloating for about 2-3 weeks with mild constipation. PMD did blood work which showed increased LFTs and send him to get an outpatient US which showed multiple masses on liver, unclear if mets or primary liver malignancy and also cirrhosis as well as main portal vein occlusion. No fevers, chills, nausea, vomiting, diarrhea, chest pain, SOB, lower extremity swelling/warmth. Patient reports a 20lb weight loss. Patient reports that he has occasional tremors when he tries to hold something that has started 8 months ago. (21 Jul 2018 02:26)    As reported from IM, this morning he is resting comfortably in bed and reports feeling nausea, weakness, multiple episodes of non bloody diarrhea over night, and some night sweats over night.   Radiology -- critical finding about R segmental and subsegmental PE's as well as increased in complex ascities around liver concerning for hemoperitonium. Heparin GGT was stopped as well as all anticoagulation and GI recommended CT abdomen w/ GI bleed protocol per radiology. Stat repeat CBC, CMP and EKG were ordered. Patient is doing ok considering. Hb stable at 9.9 unchanged today but dropped from 14.1 on admission. Will monitor vital signs Q3-4hrs as he was Tachycardic in the 120's this morning, improved to low 100's with fluids. Denies CP, SOB, palpitations, emesis, headache, abdominal pain, just feels bloated.       Vitals: T(F): 97.9 (07-24-18 @ 00:56), Max: 98.9 (07-23-18 @ 16:26)  HR: 109 (07-24-18 @ 00:56)  BP: 127/58 (07-24-18 @ 00:56) (115/68 - 127/58)  RR: 18 (07-24-18 @ 00:56)  SpO2: 96% (07-24-18 @ 00:56)    In:   07-22-18 @ 07:01  -  07-23-18 @ 07:00  --------------------------------------------------------  IN: 0 mL    07-23-18 @ 07:01  -  07-24-18 @ 05:25  --------------------------------------------------------  IN: 775 mL      Out:   07-22-18 @ 07:01  -  07-23-18 @ 07:00  --------------------------------------------------------  OUT:    Voided: 450 mL  Total OUT: 450 mL      07-23-18 @ 07:01  -  07-24-18 @ 05:25  --------------------------------------------------------  OUT:  Total OUT: 0 mL    Net:   07-22-18 @ 07:01  -  07-23-18 @ 07:00  --------------------------------------------------------  NET: -450 mL    07-23-18 @ 07:01  -  07-24-18 @ 05:25  --------------------------------------------------------  NET: 775 mL    Diet: Diet, DASH/TLC:   Sodium & Cholesterol Restricted  Low Fat (LOWFAT)  1500mL Fluid Restriction ([id number]) (07-21-18 @ 03:14)    IV Fluids: yes, Type: sodium chloride 0.9%. 1000 milliLiter(s) (75 mL/Hr) IV Continuous <Continuous>    General: NAD, AAOx3, calm and cooperative  HEENT: NCAT, No cervical or SC LAD  Cardiac: Tachycardic 110-115bpm, S1, S2, no Murmurs, rubs or gallops  Respiratory: CTAB, normal respiratory effort, breath sounds equal BL, no wheeze, rhonchi or crackles  Abdomen: Soft, +distended, non-tender, +bowel sounds, no rebound/guarding, no masses palpated  Vascular: Pulses 2+ throughout, extremities well perfused  Skin: Warm/dry, normal color, no jaundice    Medications: [Standing]  sodium chloride 0.9%. 1000 milliLiter(s) (75 mL/Hr) IV Continuous <Continuous>    DVT Prophylaxis:   GI Prophylaxis:   Antibiotics:   Anticoagulation:   Medications:[PRN]  ondansetron    Tablet 4 milliGRAM(s) Oral every 6 hours PRN    Labs:                        9.8    14.17 )-----------( 206      ( 23 Jul 2018 18:13 )             29.7     07-23    129<L>  |  93<L>  |  36<H>  ----------------------------<  133<H>  4.5   |  21  |  1.4    Ca    8.3<L>      23 Jul 2018 10:15  Phos  4.0     07-23  Mg     1.9     07-23    TPro  6.0  /  Alb  3.1<L>  /  TBili  1.7<H>  /  DBili  0.7<H>  /  AST  393<H>  /  ALT  75<H>  /  AlkPhos  300<H>  07-23    LIVER FUNCTIONS - ( 23 Jul 2018 10:15 )  Alb: 3.1 g/dL / Pro: 6.0 g/dL / ALK PHOS: 300 U/L / ALT: 75 U/L / AST: 393 U/L / GGT: x           PTT - ( 23 Jul 2018 10:15 )  PTT:40.9 sec  Urine/Micro:    Imaging:  < from: CT Angio Abdomen and Pelvis w/ IV Cont (07.23.18 @ 20:58) >  Liver cirrhosis. Unchanged diffuse heterogeneous arterial   hyperenhancement with portal venous washout throughout the right hepatic  lobe, up to approximately 15 cm, with thrombosis of the right   intrahepatic and distal extrahepatic portal veins. Attenuated, but patent   left intrahepatic portal  vein. Additional, at least two hypovascular masses within the left   hepatic lobe, measuring up to 1.2 cm at segment II. Partially exophytic 6   cm  component off right inferior hepatic tip of tumor. No evidence of active   contrast extravasation.    < end of copied text >      Assessment:  72y Male patient consulted for left lobe liver mass with the above physical exam, labs, and imaging findings.    Plan:  C/W GI w/u, EGD, Colonoscopy  C/W current w/u, labs  F/U hepatic protocol CT A/P  F/U IR for chemoembolization of liver lesion  Consider palliative consult, establish goals of care with patient  Consider monitored setting if patient continues to be tachy      Date/Time: 07-24-18 @ 05:25

## 2018-07-24 NOTE — PROGRESS NOTE ADULT - ASSESSMENT
72 year old man with PMH of HLD and Hernia initially presented to the ED with elevated liver enzymes, found to have incidental cirrhosis and portal vein thrombosis 2/2 to tumor invasion per radiology on U/S. CT angio Abdomen findings consistent with HCC w possible mets; CT Abdomen on 7/23 showed increased upper abdominal ascites with areas of new hyperdensity concerning for hemorrhage in addition to segmental and subsegmental R pulmonary emboli. Due to drop in Hb and possible hemoperitoneum, anticoagulation was d/c; Surgery does not recommend any surgical intervention. IR will be consulted for possible treatment options and palliative for goals of care. Awaiting GI recommendations for anticoagulation.     1. Hepatic Malignancy: likely HCC  - Diffuse tumor infiltration through the right hepatic lobe and 2 masses in the left hepatic lobe.   - R. pulmonary emboli noted on CT. No DVT on US.  - Elevated AFP ([id number]), CA 19-9: 58  - Hepatitis profile negative  - follow CLD work up ABBI, AMA, ASMA, SPEP, Ceruloplasmin, A1AT, ferritin, transferin saturation, ANTI LKM1  - As per Heme onc evaluation; a biopsy is needed for treatment options but not possible due to a possible hemoperitoneum  - No surgical intervention at the moment  - IR consult for treatment options, GI reccs for anticoagulation    2. Hemoperitoneum on CT: 2/2 to liver masses and Heparin drip (discontinued 7/23 am)  - Hgb dropped 12.2--->9.9 (stable H:H on repeat labs)  - Lactate 2.8  - Pt was tachycardic, afebrile, stable bp  - Repeat EKG comparable to EKG on admission  - Repeat CT abdomen on 7/23 comparable with initial hyperdensity hemoperitoneum  - Vascular surgery recommended GI clearance prior to restarting anticoagulation    3. Portal vein thrombosis vs tumor invasion  - GI following  - Heparin drip anticoagulation stopped  - Monitor H:H    4. PE of right lung  - LLE venous duplex negative  - Tachycardic on EKG  - no evidence of coagulopathy  - Heparin d/c due to hemoperitoneum  - c/w SCD for DVT prophylaxis   - monitor respiratory status closely   - EGD for screening varices prior to anticoagulation   - Anticoagulation to be restarted once cleared by GI      DVT ppx: SCD  Diet: Dash diet 72 year old man with PMH of HLD and Hernia initially presented to the ED with elevated liver enzymes, found to have incidental cirrhosis and portal vein thrombosis 2/2 to tumor invasion per radiology on U/S. CT angio Abdomen findings consistent with HCC w possible mets; CT Abdomen on 7/23 showed increased upper abdominal ascites with areas of new hyperdensity concerning for hemorrhage in addition to segmental and subsegmental R pulmonary emboli. Due to drop in Hb and possible hemoperitoneum, anticoagulation was d/c; Surgery does not recommend any surgical intervention. IR recommends an MRI liver protocol for further assessment. Awaiting GI recommendations for anticoagulation.     1. Hepatic Malignancy: likely HCC  - Diffuse tumor infiltration through the right hepatic lobe and 2 masses in the left hepatic lobe.   - R. pulmonary emboli noted on CT. No DVT on US.  - Elevated AFP ([id number]), CA 19-9: 58  - Hepatitis profile negative  - follow CLD work up ABBI, AMA, ASMA, SPEP, Ceruloplasmin, A1AT, ferritin, transferin saturation, ANTI LKM1  - IR recommends an MRI liver protocol to confirm HCC; no need for biopsy if MRI confirms HCC  - No surgical intervention at the moment  - IR consulted for treatment options, GI reccs for anticoagulation  - Heme onc is following    2. Hemoperitoneum on CT: 2/2 to liver masses and Heparin drip (discontinued 7/23 am)  - Hgb dropped 12.2--->9.9 (stable H:H on repeat labs)  - Lactate 2.8  - Pt was tachycardic, afebrile, stable bp  - Repeat EKG comparable to EKG on admission  - Repeat CT abdomen on 7/23 comparable with initial hyperdensity hemoperitoneum  - Vascular surgery recommended GI clearance prior to restarting anticoagulation    3. Portal vein thrombosis vs tumor invasion  - GI following  - Heparin drip anticoagulation stopped  - Monitor H:H    4. PE of right lung  - LLE venous duplex negative  - Tachycardic on EKG  - no evidence of coagulopathy  - Heparin d/c due to hemoperitoneum  - c/w SCD for DVT prophylaxis   - monitor respiratory status closely   - EGD for screening varices prior to anticoagulation   - Anticoagulation to be restarted once cleared by GI      DVT ppx: SCD  Diet: Dash diet 72 year old man with PMH of HLD and Hernia initially presented to the ED with elevated liver enzymes, found to have incidental cirrhosis and portal vein thrombosis 2/2 to tumor invasion per radiology on U/S. CT angio Abdomen findings consistent with HCC w possible mets; CT Abdomen on 7/23 showed increased upper abdominal ascites with areas of new hyperdensity concerning for hemorrhage in addition to segmental and subsegmental R pulmonary emboli. Due to drop in Hb and possible hemoperitoneum, anticoagulation was d/c; Surgery does not recommend any surgical intervention. IR recommends an MRI liver protocol for further assessment. Awaiting GI recommendations for anticoagulation.     1. Hepatic Malignancy: likely HCC  - Diffuse tumor infiltration through the right hepatic lobe and 2 masses in the left hepatic lobe.   - R. pulmonary emboli noted on CT. No DVT on US.  - Elevated AFP ([id number]), CA 19-9: 58  - Hepatitis profile negative  - IR recommends an MRI liver protocol to confirm HCC; no need for biopsy if MRI confirms HCC  - No surgical intervention at the moment  - IR consulted for treatment options, GI reccs for anticoagulation  - Heme onc is following    2. Hemoperitoneum on CT: 2/2 to liver masses and Heparin drip (discontinued 7/23 am)  - Hgb dropped 12.2--->9.9---> 9.0   - Lactate 2.8, trend  - Pt was tachycardic, afebrile, stable bp  - Repeat EKG comparable to EKG on admission  - Repeat CT abdomen on 7/23 comparable with initial hyperdensity hemoperitoneum  - Vascular surgery recommended GI clearance prior to restarting anticoagulation    3. Portal vein thrombosis vs tumor invasion  - GI following  - Heparin drip anticoagulation stopped  - Monitor H:H    4. PE of right lung  - LLE venous duplex negative  - Tachycardic on EKG  - no evidence of coagulopathy  - Heparin d/c due to hemoperitoneum  - c/w SCD for DVT prophylaxis   - monitor respiratory status closely   - EGD for screening varices prior to anticoagulation?  - Anticoagulation to be restarted once cleared by GI      DVT ppx: SCD  Diet: Dash diet

## 2018-07-24 NOTE — PROGRESS NOTE ADULT - ASSESSMENT
72 year old man with PMH of HLD and Hernia initially presented to the ED with elevated liver enzymes, found to have incidental cirrhosis and portal vein thrombosis 2/2 to tumor invasion per radiology on U/S. CT angio Abdomen findings consistent with HCC w mets; CT Abdomen on 7/23 showed increased upper abdominal ascites with areas of hyperdensity concerning for hemorrhage in addition to segmental and subsegmental R pulmonary emboli. Due to drop in Hb and possible hemoperitoneum, anticoagulation was d/c yesterday, and STAT CBC and f/u CT Abdomen were ordered. GI, Heme Onc, IR, Vascular, and Surgery are following.    1. Hepatic Malignancy: likely HCC  - Diffuse tumor infiltration through the right hepatic lobe and 2 masses in the left hepatic lobe.   - R. pulmonary emboli noted on CT. No DVT on US.  - Elevated AFP ([id number]), CA 19-9: 58  - Hepatitis profile negative  - As of now no plans for bx  - MRI Abdomen w/ [facility]    3. Hemoperitoneum on CT: 2/2 to liver masses and Heparin drip (discontinued 7/23 am)  - Hgb dropped 12.2--->9.9 (stable H:H on repeat labs)  - Hyperkalemia improved (6.0-->4.5)  - Lactate 2.8  - Pt was tachycardic, afebrile, stable bp  - Repeat EKG comparable to EKG on admission  - CT Abdomen and pelvis GI bleed protocol ordered STAT    Portal vein thrombosis vs tumor invasion  - GI and vascular sx f/u  - Heparin drip anticoagulation stopped  - Monitor H:H    4. PE of right lung  - LLE venous duplex negative  - Tachycardic on EKG  - Heparin d/c due to hemoperitoneum  - c/w SCD for DVT prophylaxis   - monitor respiratory status closely 72 year old man with PMH of HLD and Hernia initially presented to the ED with elevated liver enzymes, found to have incidental cirrhosis and portal vein thrombosis 2/2 to tumor invasion per radiology on U/S. CT angio Abdomen findings consistent with HCC w mets; CT Abdomen on 7/23 showed increased upper abdominal ascites with areas of hyperdensity concerning for hemorrhage in addition to segmental and subsegmental R pulmonary emboli. Due to drop in Hb and possible hemoperitoneum, anticoagulation was d/c yesterday, and STAT CBC and f/u CT Abdomen were ordered. GI, Heme Onc, IR, Vascular, and Surgery are following.    1. Hepatic Malignancy: likely HCC  - Diffuse tumor infiltration through the right hepatic lobe and 2 masses in the left hepatic lobe.   - R. pulmonary emboli noted on CT. No DVT on US.  - Elevated AFP ([id number]), CA 19-9 - 58  - Hepatitis profile negative  - As of now no plans for bx  - MRI Abdomen w/ [facility]    3. Hemoperitoneum on CT: 2/2 to liver masses and Heparin drip (discontinued 7/23 am)  - Hgb dropped 12.2--->9.9 (stable H:H on repeat labs)  - Hyperkalemia improved (6.0-->4.5)  - Lactate 2.8  - Pt was tachycardic, afebrile, stable bp  - Repeat EKG comparable to EKG on admission  - CT Abdomen and pelvis GI bleed protocol ordered STAT    Portal vein thrombosis vs tumor invasion  - GI and vascular sx f/u  - Heparin drip anticoagulation stopped 2ndary to the Bleeding and hemoglobin drop . monitor cbc daily. NO Anti coagulation until the H/H is stable   - Monitor H:H    4. Acute PE of right lung  - LLE venous duplex negative  - Tachycardic on EKG  - Heparin d/c due to hemoperitoneum  - c/w SCD for DVT prophylaxis   - monitor respiratory status closely   VASCULAR EVALUATION IF ANY PROCEDURE COULD BE PERFORMED 72 year old man with PMH of HLD and Hernia initially presented to the ED with elevated liver enzymes, found to have incidental cirrhosis and portal vein thrombosis 2/2 to tumor invasion per radiology on U/S. CT angio Abdomen findings consistent with HCC w mets; CT Abdomen on 7/23 showed increased upper abdominal ascites with areas of hyperdensity concerning for hemorrhage in addition to segmental and subsegmental R pulmonary emboli. Due to drop in Hb and possible hemoperitoneum, anticoagulation was d/c yesterday, and STAT CBC and f/u CT Abdomen were ordered. GI, Heme Onc, IR, Vascular, and Surgery are following.    1. Hepatic Malignancy: likely HCC  - Diffuse tumor infiltration through the right hepatic lobe and 2 masses in the left hepatic lobe. Approximately 15 cm with portal vein invasion, R hepatic lobe  - R. pulmonary emboli segmental and subsegmental noted on CT. No DVT on US.  - Elevated AFP ([id number]), CA 19-9 - 58  - Hepatitis profile negative  - As of now no plans for bx  - MRI Abdomen w/ [facility] Liver protocol  -IR Consulted for possible local regional therapy Y-90 or Tace options  -GI following  -Surgery no plans at this time    3. Hemoperitoneum on CT: 2/2 to liver masses and Heparin drip (discontinued 7/23 am)  - Hgb dropped 12.2--->9.9--->9.0 (stable H:H on repeat labs) yesterday  - Lactate 2.8, will trend  - Pt was tachycardic, afebrile, stable bp over night  - Repeat EKG comparable to EKG on admission  - CT Abdomen: stable, abdominal free fluid, no contrast extravasation    Portal vein thrombosis vs tumor invasion  - GI and vascular sx f/u  - Heparin drip anticoagulation stopped 2ndary to the Bleeding and hemoglobin drop . monitor cbc daily. NO Anti coagulation until the H/H is stable   - Monitor H:H    4. Acute PE of right lung  - LLE venous duplex negative  - Tachycardic on EKG  - Heparin d/c due to hemoperitoneum  - c/w SCD for DVT prophylaxis   - monitor respiratory status closely   VASCULAR EVALUATION IF ANY PROCEDURE COULD BE PERFORMED

## 2018-07-24 NOTE — PROGRESS NOTE ADULT - SUBJECTIVE AND OBJECTIVE BOX
Vascular Surgery - Follow up    See full consult from 7/20/18    dx: Liver cirrhosis, liver malignancy, portal vein thrombosis and right pulmonary emboli    Current issue: anemia with anticoagulation    Plan: stable Hgb, f/u GI evaluation  recommending restarting AC when GI clears    case d/w Dr. Hernández

## 2018-07-24 NOTE — CONSULT NOTE ADULT - SUBJECTIVE AND OBJECTIVE BOX
INTERVENTIONAL RADIOLOGY CONSULT:     Procedure Requested: possible liver biopsy / radioembolization    HPI:  72 year old man with a past medical history of HLD and Hernia presenting for incidental finding of portal vein thrombosis after result of elevated LFTs on regular blood work. Patient reports that he has been having abominal bloating for about 2-3 weeks with mild constipation. PMD did blood work which showed increased LFTs and send him to get an outpatient US which showed multiple masses on liver, unclear if mets or primary liver malignancy and also cirrhosis as well as main portal vein occlusion. No fevers, chills, nausea, vomiting, diarrhea, chest pain, SOB, lower extremity swelling/warmth. Patient reports a 20lb weight loss. Patient reports that he has occasional tremors when he tries to hold something that has started 8 months ago. (21 Jul 2018 02:26)      PAST MEDICAL & SURGICAL HISTORY:  Incarcerated right inguinal hernia  CAD (coronary artery disease)  Hypercholesterolemia  CAD S/P percutaneous coronary angioplasty: no stents      MEDICATIONS  (STANDING):  sodium chloride 0.9%. 1000 milliLiter(s) (75 mL/Hr) IV Continuous <Continuous>    MEDICATIONS  (PRN):  ondansetron    Tablet 4 milliGRAM(s) Oral every 6 hours PRN Nausea and/or Vomiting      Allergies    No Known Allergies    Intolerances        Social History:   Smoking: Yes [ ]  No [ ]   ______pk yrs  ETOH  Yes [ ]  No [ ]  Social [ ]  DRUGS:  Yes [ ]  No [ ]  if so what______________    FAMILY HISTORY:  No pertinent family history in first degree relatives      Physical Exam:   Vital Signs Last 24 Hrs  T(C): 36.2 (24 Jul 2018 12:00), Max: 37.2 (23 Jul 2018 16:26)  T(F): 97.2 (24 Jul 2018 12:00), Max: 98.9 (23 Jul 2018 16:26)  HR: 106 (24 Jul 2018 12:00) (102 - 118)  BP: 128/67 (24 Jul 2018 12:00) (115/68 - 128/67)  BP(mean): --  RR: 18 (24 Jul 2018 12:00) (18 - 18)  SpO2: 95% (24 Jul 2018 09:01) (88% - 96%)      Labs:                         9.0    9.75  )-----------( 136      ( 24 Jul 2018 05:34 )             27.2     07-24    134<L>  |  99  |  32<H>  ----------------------------<  91  4.2   |  21  |  1.1    Ca    7.6<L>      24 Jul 2018 05:34  Phos  4.0     07-23  Mg     2.0     07-24    TPro  5.3<L>  /  Alb  2.8<L>  /  TBili  2.1<H>  /  DBili  0.9<H>  /  AST  289<H>  /  ALT  60<H>  /  AlkPhos  246<H>  07-24    PTT - ( 23 Jul 2018 10:15 )  PTT:40.9 sec    Pertinent labs:                      9.0    9.75  )-----------( 136      ( 24 Jul 2018 05:34 )             27.2       07-24    134<L>  |  99  |  32<H>  ----------------------------<  91  4.2   |  21  |  1.1    Ca    7.6<L>      24 Jul 2018 05:34  Phos  4.0     07-23  Mg     2.0     07-24    TPro  5.3<L>  /  Alb  2.8<L>  /  TBili  2.1<H>  /  DBili  0.9<H>  /  AST  289<H>  /  ALT  60<H>  /  AlkPhos  246<H>  07-24      PTT - ( 23 Jul 2018 10:15 )  PTT:40.9 sec    Radiology & Additional Studies:   < from: CT Angio Abdomen and Pelvis w/ IV Cont (07.23.18 @ 20:58) >  IMPRESSION:    Since July 20, 2018:    1. Unchanged liver lesions, described above, with portal vein thrombosis,   suspicious for metastatic HCC with likely tumor thrombosis. No evidence   of contrast extravasation.    2. Slightly increased free abdominal fluid.    3. Additional findings unchanged.    < end of copied text >      Radiology imaging reviewed.       ASSESSMENT/ PLAN:   - consulted for possible liver biopsy / radioembolization   - CTA images reviewed, recommend MRI liver protocol to further access and confirm HCC  - case discussed with Dr. Landon - if MRI confirms diagnosis, biopsy not needed prior to treatment  - attending will discuss treatment options with Dr. Landon post MRI results     Thank you for the courtesy of this consult, please call [phone] with any further questions.

## 2018-07-24 NOTE — PROGRESS NOTE ADULT - SUBJECTIVE AND OBJECTIVE BOX
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 This morning he is resting comfortably in bed and reports no new issues or overnight events. Still continues to feel bloated. His nausea has decreased, denies diarrhea, abdominal pain this morning. Denies CP, SOB, Emesis, dizziness this AM.    PAST MEDICAL & SURGICAL HISTORY  Incarcerated right inguinal hernia  CAD (coronary artery disease)  Hypercholesterolemia  CAD S/P percutaneous coronary angioplasty: no stents    SOCIAL HISTORY:  Denies smoking/alcohol/drug use.     ALLERGIES:  No Known Allergies    MEDICATIONS:  STANDING MEDICATIONS  sodium chloride 0.9%. 1000 milliLiter(s) IV Continuous <Continuous>    PRN MEDICATIONS  ondansetron    Tablet 4 milliGRAM(s) Oral every 6 hours PRN    VITALS:   T(F): 97.2  HR: 106  BP: 128/67  RR: 18  SpO2: 95%    LABS:                        9.0    9.75  )-----------( 136      ( 24 Jul 2018 05:34 )             27.2     07-24    134<L>  |  99  |  32<H>  ----------------------------<  91  4.2   |  21  |  1.1    Ca    7.6<L>      24 Jul 2018 05:34  Phos  4.0     07-23  Mg     2.0     07-24    TPro  5.3<L>  /  Alb  2.8<L>  /  TBili  2.1<H>  /  DBili  0.9<H>  /  AST  289<H>  /  ALT  60<H>  /  AlkPhos  246<H>  07-24    PTT - ( 23 Jul 2018 10:15 )  PTT:40.9 sec      RADIOLOGY:  < from: CT Angio Abdomen and Pelvis w/ IV Cont (07.23.18 @ 20:58) >  HEPATOBILIARY: Liver cirrhosis. Unchanged diffuse heterogeneous arterial   hyperenhancement with portal venous washout throughout the right hepatic  lobe, up to approximately 15 cm, with thrombosis of the right   intrahepatic and distal extrahepatic portal veins. Attenuated, but patent   left intrahepatic portal  vein. Additional, at least two hypovascular masses within the left   hepatic lobe, measuring up to 1.2 cm at segment II. Partially exophytic 6   cm  component off right inferior hepatic tip of tumor. No evidence of active   contrast extravasation.    < end of copied text >  < from: CT Angio Abdomen and Pelvis w/ IV Cont (07.23.18 @ 20:58) >  IMPRESSION:    Since July 20, 2018:    1. Unchanged liver lesions, described above, with portal vein thrombosis,   suspicious for metastatic HCC with likely tumor thrombosis. No evidence   of contrast extravasation.    2. Slightly increased free abdominal fluid.    3. Additional findings unchanged.      < end of copied text >      PHYSICAL EXAM:  GEN: No acute distress  LUNGS: Clear to auscultation bilaterally   HEART: S1/S2 present. RRR.   ABD: Soft obese, distended, non-tender, no guarding, Bowel sounds present   EXT: NC/NC/NE/2+PP/LOPES/Skin Intact.   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 This morning he is resting comfortably in bed and reports no new issues or overnight events. Still continues to feel bloated. His nausea has decreased, denies diarrhea, abdominal pain this morning. Denies CP, SOB, Emesis, dizziness this AM.    PAST MEDICAL & SURGICAL HISTORY  Incarcerated right inguinal hernia  CAD (coronary artery disease)  Hypercholesterolemia  CAD S/P percutaneous coronary angioplasty: no stents    SOCIAL HISTORY:  Denies smoking/alcohol/drug use.     ALLERGIES:  No Known Allergies    MEDICATIONS:  STANDING MEDICATIONS  sodium chloride 0.9%. 1000 milliLiter(s) IV Continuous <Continuous>    PRN MEDICATIONS  ondansetron    Tablet 4 milliGRAM(s) Oral every 6 hours PRN    VITALS:   T(F): 97.2  HR: 106  BP: 128/67  RR: 18  SpO2: 95%    LABS:                        9.0    9.75  )-----------( 136      ( 24 Jul 2018 05:34 )             27.2     07-24    134<L>  |  99  |  32<H>  ----------------------------<  91  4.2   |  21  |  1.1    Ca    7.6<L>      24 Jul 2018 05:34  Phos  4.0     07-23  Mg     2.0     07-24    TPro  5.3<L>  /  Alb  2.8<L>  /  TBili  2.1<H>  /  DBili  0.9<H>  /  AST  289<H>  /  ALT  60<H>  /  AlkPhos  246<H>  07-24    PTT - ( 23 Jul 2018 10:15 )  PTT:40.9 sec      RADIOLOGY:  < from: CT Angio Abdomen and Pelvis w/ IV Cont (07.23.18 @ 20:58) >  HEPATOBILIARY: Liver cirrhosis. Unchanged diffuse heterogeneous arterial   hyperenhancement with portal venous washout throughout the right hepatic  lobe, up to approximately 15 cm, with thrombosis of the right   intrahepatic and distal extrahepatic portal veins. Attenuated, but patent   left intrahepatic portal  vein. Additional, at least two hypovascular masses within the left   hepatic lobe, measuring up to 1.2 cm at segment II. Partially exophytic 6   cm  component off right inferior hepatic tip of tumor. No evidence of active   contrast extravasation.    < end of copied text >  < from: CT Angio Abdomen and Pelvis w/ IV Cont (07.23.18 @ 20:58) >  IMPRESSION:    Since July 20, 2018:    1. Unchanged liver lesions, described above, with portal vein thrombosis,   suspicious for metastatic HCC with likely tumor thrombosis. No evidence   of contrast extravasation.    2. Slightly increased free abdominal fluid.    3. Additional findings unchanged.      < end of copied text >      PHYSICAL EXAM:  GEN: No acute distress  LUNGS / chest : Clear to auscultation bilaterally   HEART/ CVS : S1/S2 present. RRR.   ABD: Soft obese, distended, non-tender, no guarding, Bowel sounds present   EXT: NC/NC/NE/2+PP/LOPES/Skin Intact.   NEURO: AAOX3  Skin - Pale looking     Intravenous access: B/L

## 2018-07-24 NOTE — PROGRESS NOTE ADULT - SUBJECTIVE AND OBJECTIVE BOX
24H events:    Patient is a 72y old Male who presents with a chief complaint of Incidental liver lesions, cirrhosis and portal vein thrombosis on outpatient US (21 Jul 2018 02:26)    Primary diagnosis of Portal vein thrombosis secondary to 15.cm HCC invasion, likely tumor Thrombus    Today is hospital day 3d. Patient was examined at bedside this morning. Overnight pt desaturated to 86-88% on RA; put on 2L O2 by NC with improved saturation. He denies any n/v, SOB, CP, focal weakness,  but admits to continued bouts of watery diarrhea. Patient remains tachycardic (newly diagnosed R PE on CT), currently not on any anticoagulation (SCD in place). Repeat abdominal CT on 7/23 was comparable to CT 7/23 showing unchanged diffuse hyperenhancement. Pt H:H is stable currently with slight drop from yesterday (9.8-->9.0). As per surgery evaluation 7/23, no further surgical intervention was recommended at the moment. IR will be consulted for possible treatment options and optimal imaging. Vascular surgery reevaluated the patient and recommend to follow GI reccs in regards to anticoagulation. Palliative Care will also be consulted to determine goals of care.     PAST MEDICAL & SURGICAL HISTORY  Incarcerated right inguinal hernia  CAD (coronary artery disease)  Hypercholesterolemia  CAD S/P percutaneous coronary angioplasty: no stents    SOCIAL HISTORY:  Negative for smoking/alcohol/drug use.     ALLERGIES:  No Known Allergies    MEDICATIONS:  STANDING MEDICATIONS  sodium chloride 0.9%. 1000 milliLiter(s) IV Continuous <Continuous>    PRN MEDICATIONS  ondansetron    Tablet 4 milliGRAM(s) Oral every 6 hours PRN    VITALS:   T(F): 97.2  HR: 106  BP: 128/67  RR: 18  SpO2: 95%    LABS:                        9.0    9.75  )-----------( 136      ( 24 Jul 2018 05:34 )             27.2     07-24    134<L>  |  99  |  32<H>  ----------------------------<  91  4.2   |  21  |  1.1    Ca    7.6<L>      24 Jul 2018 05:34  Phos  4.0     07-23  Mg     2.0     07-24    TPro  5.3<L>  /  Alb  2.8<L>  /  TBili  2.1<H>  /  DBili  0.9<H>  /  AST  289<H>  /  ALT  60<H>  /  AlkPhos  246<H>  07-24    PTT - ( 23 Jul 2018 10:15 )  PTT:40.9 sec              RADIOLOGY:    PHYSICAL EXAM:  GEN: No acute distress,   LUNGS: Mild decrease in breath sounds on Right; no acute respiratory distress; no wheezing, crackles or rhonci  HEART: S1/S2 present. RRR.   ABD: Soft, non-tender, mildly distended. Moderately hyperactive BS present  NEURO: AAOX3 24H events:    Patient is a 72y old Male who presents with a chief complaint of Incidental liver lesions, cirrhosis and portal vein thrombosis on outpatient US (21 Jul 2018 02:26)    Primary diagnosis of Portal vein thrombosis secondary to 15.cm HCC invasion, likely tumor Thrombus    Today is hospital day 3d. Patient was examined at bedside this morning. Overnight pt desaturated to 86-88% on RA; put on 2L O2 by NC with improved saturation. He denies any n/v, SOB, CP, focal weakness,  but admits to continued bouts of watery diarrhea. Patient remains tachycardic (newly diagnosed R PE on CT), currently not on any anticoagulation (SCD in place). Repeat abdominal CT on 7/23 was comparable to CT 7/23 showing unchanged diffuse hyperenhancement. Pt H:H is stable currently with slight drop from yesterday (9.8-->9.0). As per surgery evaluation 7/23, no further surgical intervention was recommended at the moment. IR will be consulted for possible treatment options and optimal imaging. Vascular surgery reevaluated the patient and recommend to follow GI reccs in regards to anticoagulation. Palliative care will be consulted later for goals of care as this is a new diagnosis for patient and he is currently being offered treatment options.     PAST MEDICAL & SURGICAL HISTORY  Incarcerated right inguinal hernia  CAD (coronary artery disease)  Hypercholesterolemia  CAD S/P percutaneous coronary angioplasty: no stents    SOCIAL HISTORY:  Negative for smoking/alcohol/drug use.     ALLERGIES:  No Known Allergies    MEDICATIONS:  STANDING MEDICATIONS  sodium chloride 0.9%. 1000 milliLiter(s) IV Continuous <Continuous>    PRN MEDICATIONS  ondansetron    Tablet 4 milliGRAM(s) Oral every 6 hours PRN    VITALS:   T(F): 97.2  HR: 106  BP: 128/67  RR: 18  SpO2: 95%    LABS:                        9.0    9.75  )-----------( 136      ( 24 Jul 2018 05:34 )             27.2     07-24    134<L>  |  99  |  32<H>  ----------------------------<  91  4.2   |  21  |  1.1    Ca    7.6<L>      24 Jul 2018 05:34  Phos  4.0     07-23  Mg     2.0     07-24    TPro  5.3<L>  /  Alb  2.8<L>  /  TBili  2.1<H>  /  DBili  0.9<H>  /  AST  289<H>  /  ALT  60<H>  /  AlkPhos  246<H>  07-24    PTT - ( 23 Jul 2018 10:15 )  PTT:40.9 sec              RADIOLOGY:    PHYSICAL EXAM:  GEN: No acute distress,   LUNGS: Mild decrease in breath sounds on Right; no acute respiratory distress; no wheezing, crackles or rhonci  HEART: S1/S2 present. RRR.   ABD: Soft, non-tender, mildly distended. Moderately hyperactive BS present  NEURO: AAOX3

## 2018-07-25 LAB
ALBUMIN SERPL ELPH-MCNC: 2.7 G/DL — LOW (ref 3.5–5.2)
ALP SERPL-CCNC: 228 U/L — HIGH (ref 30–115)
ALT FLD-CCNC: 52 U/L — HIGH (ref 0–41)
ANION GAP SERPL CALC-SCNC: 14 MMOL/L — SIGNIFICANT CHANGE UP (ref 7–14)
AST SERPL-CCNC: 222 U/L — HIGH (ref 0–41)
BILIRUB DIRECT SERPL-MCNC: 1.2 MG/DL — HIGH (ref 0–0.2)
BILIRUB INDIRECT FLD-MCNC: 1.3 MG/DL — HIGH (ref 0.2–1.2)
BILIRUB SERPL-MCNC: 2.5 MG/DL — HIGH (ref 0.2–1.2)
BUN SERPL-MCNC: 27 MG/DL — HIGH (ref 10–20)
CALCIUM SERPL-MCNC: 8 MG/DL — LOW (ref 8.5–10.1)
CHLORIDE SERPL-SCNC: 100 MMOL/L — SIGNIFICANT CHANGE UP (ref 98–110)
CO2 SERPL-SCNC: 22 MMOL/L — SIGNIFICANT CHANGE UP (ref 17–32)
CREAT SERPL-MCNC: 0.9 MG/DL — SIGNIFICANT CHANGE UP (ref 0.7–1.5)
GLUCOSE SERPL-MCNC: 94 MG/DL — SIGNIFICANT CHANGE UP (ref 70–99)
HCT VFR BLD CALC: 27.4 % — LOW (ref 42–52)
HGB BLD-MCNC: 8.9 G/DL — LOW (ref 14–18)
LACTATE SERPL-SCNC: 1.5 MMOL/L — SIGNIFICANT CHANGE UP (ref 0.5–2.2)
MCHC RBC-ENTMCNC: 30.3 PG — SIGNIFICANT CHANGE UP (ref 27–31)
MCHC RBC-ENTMCNC: 32.5 G/DL — SIGNIFICANT CHANGE UP (ref 32–37)
MCV RBC AUTO: 93.2 FL — SIGNIFICANT CHANGE UP (ref 80–94)
NRBC # BLD: 0 /100 WBCS — SIGNIFICANT CHANGE UP (ref 0–0)
PLATELET # BLD AUTO: 134 K/UL — SIGNIFICANT CHANGE UP (ref 130–400)
POTASSIUM SERPL-MCNC: 4.3 MMOL/L — SIGNIFICANT CHANGE UP (ref 3.5–5)
POTASSIUM SERPL-SCNC: 4.3 MMOL/L — SIGNIFICANT CHANGE UP (ref 3.5–5)
PROT SERPL-MCNC: 5.4 G/DL — LOW (ref 6–8)
RBC # BLD: 2.94 M/UL — LOW (ref 4.7–6.1)
RBC # FLD: 14.7 % — HIGH (ref 11.5–14.5)
SODIUM SERPL-SCNC: 136 MMOL/L — SIGNIFICANT CHANGE UP (ref 135–146)
TRANSFERRIN SERPL-MCNC: 163 MG/DL — LOW (ref 200–360)
WBC # BLD: 8.92 K/UL — SIGNIFICANT CHANGE UP (ref 4.8–10.8)
WBC # FLD AUTO: 8.92 K/UL — SIGNIFICANT CHANGE UP (ref 4.8–10.8)

## 2018-07-25 RX ORDER — SODIUM CHLORIDE 9 MG/ML
1000 INJECTION, SOLUTION INTRAVENOUS
Qty: 0 | Refills: 0 | Status: DISCONTINUED | OUTPATIENT
Start: 2018-07-25 | End: 2018-07-26

## 2018-07-25 RX ORDER — DOCUSATE SODIUM 100 MG
100 CAPSULE ORAL DAILY
Qty: 0 | Refills: 0 | Status: DISCONTINUED | OUTPATIENT
Start: 2018-07-25 | End: 2018-07-25

## 2018-07-25 RX ORDER — SENNA PLUS 8.6 MG/1
1 TABLET ORAL DAILY
Qty: 0 | Refills: 0 | Status: DISCONTINUED | OUTPATIENT
Start: 2018-07-25 | End: 2018-07-25

## 2018-07-25 RX ADMIN — SODIUM CHLORIDE 75 MILLILITER(S): 9 INJECTION INTRAMUSCULAR; INTRAVENOUS; SUBCUTANEOUS at 00:24

## 2018-07-25 RX ADMIN — SODIUM CHLORIDE 50 MILLILITER(S): 9 INJECTION, SOLUTION INTRAVENOUS at 17:21

## 2018-07-25 NOTE — PROGRESS NOTE ADULT - SUBJECTIVE AND OBJECTIVE BOX
24H events:    Patient is a 72y old Male who presents with a chief complaint of Incidental liver lesions, cirrhosis and portal vein thrombosis on outpatient US (21 Jul 2018 02:26)    Primary diagnosis of Portal vein thrombosis secondary to  15.cm HCC invasion, likely tumor Thrombus    Today is hospital day 4d. Patient was examined at bedside and c/o of mild lower abdominal pain and distension, likely due to gas. Overnight no acute events were reported; he is currently on 2L O2 NC saturation 99%. Pt was evaluated by GI for recommendations for reinstating anticoagulation However, at the moment GI is not recommending (verbal conversation) anticoagulation due to hemoperitoneum shown on CT; further GI reccs are to f/u w Endoscopy and colonoscopy. IR is following patient and is awaiting MRI (scheduled today) to confirm diagnosis/embolization. Pt H/H is currently stable (9.0-->8.9); mild tachycardia, stable BP. Heme oncology is following pt.     PAST MEDICAL & SURGICAL HISTORY  Incarcerated right inguinal hernia  CAD (coronary artery disease)  Hypercholesterolemia  CAD S/P percutaneous coronary angioplasty: no stents    SOCIAL HISTORY:  Negative for smoking/alcohol/drug use.     ALLERGIES:  No Known Allergies    MEDICATIONS:  STANDING MEDICATIONS    PRN MEDICATIONS  ondansetron    Tablet 4 milliGRAM(s) Oral every 6 hours PRN    VITALS:   T(F): 98.7  HR: 102  BP: 128/64  RR: 18  SpO2: 100%    LABS:                        8.9    8.92  )-----------( 134      ( 25 Jul 2018 06:26 )             27.4     07-25    136  |  100  |  27<H>  ----------------------------<  94  4.3   |  22  |  0.9    Ca    8.0<L>      25 Jul 2018 06:26  Mg     2.0     07-24    TPro  5.4<L>  /  Alb  2.7<L>  /  TBili  2.5<H>  /  DBili  1.2<H>  /  AST  222<H>  /  ALT  52<H>  /  AlkPhos  228<H>  07-25          Lactate, Blood: 1.5 mmol/L (07-25-18 @ 06:26)          RADIOLOGY:    PHYSICAL EXAM:  GEN: No acute distress  LUNGS: Clear to auscultation bilaterally   HEART: S1/S2 present. RRR. No M/G/R  ABD: Soft, non-tender, mildly distended. Bowel sounds present, no echymosis  EXT: No edema, peripheral pulses 2+ b/L  NEURO: AAOX3 24H events:    Patient is a 72y old Male who presents with a chief complaint of Incidental liver lesions, cirrhosis and portal vein thrombosis on outpatient US (21 Jul 2018 02:26)    Primary diagnosis of Portal vein thrombosis secondary to  15.cm HCC invasion, likely tumor Thrombus    Today is hospital day 4d. Patient was examined at bedside and c/o of mild lower abdominal pain and distension, likely due to gas. Overnight no acute events were reported; he is currently on 2L O2 NC saturation 99%. Pt was evaluated by GI for recommendations for reinstating anticoagulation However, at the moment GI is not recommending (verbal conversation) anticoagulation due to hemoperitoneum shown on CT; IR is following patient and is awaiting MRI (scheduled today) to confirm diagnosis and planning for possible embolization. Pt H/H is currently stable (9.0-->8.9); mild tachycardia, stable BP. Heme oncology is following pt.     PAST MEDICAL & SURGICAL HISTORY  Incarcerated right inguinal hernia  CAD (coronary artery disease)  Hypercholesterolemia  CAD S/P percutaneous coronary angioplasty: no stents    SOCIAL HISTORY:  Negative for smoking/alcohol/drug use.     ALLERGIES:  No Known Allergies    MEDICATIONS:  STANDING MEDICATIONS    PRN MEDICATIONS  ondansetron    Tablet 4 milliGRAM(s) Oral every 6 hours PRN    VITALS:   T(F): 98.7  HR: 102  BP: 128/64  RR: 18  SpO2: 100%    LABS:                        8.9    8.92  )-----------( 134      ( 25 Jul 2018 06:26 )             27.4     07-25    136  |  100  |  27<H>  ----------------------------<  94  4.3   |  22  |  0.9    Ca    8.0<L>      25 Jul 2018 06:26  Mg     2.0     07-24    TPro  5.4<L>  /  Alb  2.7<L>  /  TBili  2.5<H>  /  DBili  1.2<H>  /  AST  222<H>  /  ALT  52<H>  /  AlkPhos  228<H>  07-25        Lactate, Blood: 1.5 mmol/L (07-25-18 @ 06:26)      RADIOLOGY:    PHYSICAL EXAM:  GEN: No acute distress, Pale  LUNGS: Clear to auscultation bilaterally   HEART: S1/S2 present. RRR. No M/G/R  ABD: Soft, non-tender, mildly distended. Bowel sounds present, no echymosis  EXT: No edema, peripheral pulses 2+ b/L  NEURO: AAOX3

## 2018-07-25 NOTE — DIETITIAN INITIAL EVALUATION ADULT. - SIGNS/SYMPTOMS
pt reported consuming less than 25% of meals pt reported >9% of severe unintentional weight loss in the past 3 months.

## 2018-07-25 NOTE — PROGRESS NOTE ADULT - SUBJECTIVE AND OBJECTIVE BOX
He is complaining of gas and multiple about 5-7 episodes of diarrhea. He sates diarrhea has been present for last month but now is more frequent. He has not had an MRI. Bili is 2.5 today, Albumin about the same. Hgb is stable at 8.9. No other complaints.            Vital Signs Last 24 Hrs  T(C): 37.1 (25 Jul 2018 07:54), Max: 37.7 (25 Jul 2018 00:26)  T(F): 98.7 (25 Jul 2018 07:54), Max: 99.8 (25 Jul 2018 00:26)  HR: 102 (25 Jul 2018 07:54) (102 - 125)  BP: 128/64 (25 Jul 2018 07:54) (126/62 - 140/60)  BP(mean): --  RR: 18 (25 Jul 2018 00:26) (18 - 18)  SpO2: 100% (25 Jul 2018 06:42) (93% - 100%)    Allergies:No Known Allergies  Intolerances  MEDICATIONS  (STANDING):  dextrose 5% + sodium chloride 0.9%. 1000 milliLiter(s) (50 mL/Hr) IV Continuous <Continuous>    MEDICATIONS  (PRN):  ondansetron    Tablet 4 milliGRAM(s) Oral every 6 hours PRN Nausea and/or Vomiting      CBC Full  -  ( 25 Jul 2018 06:26 )  WBC Count : 8.92 K/uL  Hemoglobin : 8.9 g/dL  Hematocrit : 27.4 %  Platelet Count - Automated : 134 K/uL  Mean Cell Volume : 93.2 fL  Mean Cell Hemoglobin : 30.3 pg  Mean Cell Hemoglobin Concentration : 32.5 g/dL  Auto Neutrophil # : x  Auto Lymphocyte # : x  Auto Monocyte # : x  Auto Eosinophil # : x  Auto Basophil # : x  Auto Neutrophil % : x  Auto Lymphocyte % : x  Auto Monocyte % : x  Auto Eosinophil % : x  Auto Basophil % : x               8.9    8.92  )-----------( 134      ( 07-25 @ 06:26 )             27.4                9.0    9.75  )-----------( 136      ( 07-24 @ 05:34 )             27.2                9.8    14.17 )-----------( 206      ( 07-23 @ 18:13 )             29.7                9.9    14.30 )-----------( 192      ( 07-23 @ 10:15 )             30.3                9.9    16.44 )-----------( 224      ( 07-23 @ 05:07 )             29.6                12.2   11.65 )-----------( 248      ( 07-22 @ 06:43 )             37.2                14.1   6.84  )-----------( 169      ( 07-21 @ 08:32 )             43.0                13.9   4.37  )-----------( 132      ( 07-20 @ 18:05 )             42.7       07-25    136  |  100  |  27<H>  ----------------------------<  94  4.3   |  22  |  0.9    Ca    8.0<L>      25 Jul 2018 06:26  Mg     2.0     07-24    TPro  5.4<L>  /  Alb  2.7<L>  /  TBili  2.5<H>  /  DBili  1.2<H>  /  AST  222<H>  /  ALT  52<H>  /  AlkPhos  228<H>  07-25  LIVER FUNCTIONS - ( 25 Jul 2018 06:26 )  Alb: 2.7 g/dL / Pro: 5.4 g/dL / ALK PHOS: 228 U/L / ALT: 52 U/L / AST: 222 U/L / GGT: x               REVIEW OF SYSTEMS:    CONSTITUTIONAL: Has weakness, no  fevers or chills  EYES/ENT: No visual changes;  No vertigo or throat pain   NECK: No pain or stiffness  RESPIRATORY: No cough, wheezing, hemoptysis; No shortness of breath  CARDIOVASCULAR: No chest pain or palpitations  GASTROINTESTINAL: No abdominal or epigastric pain. No nausea, vomiting, or hematemesis; Has diarrhea . No melena or hematochezia.  GENITOURINARY: No dysuria, frequency or hematuria  NEUROLOGICAL: No numbness or weakness  SKIN: No itching, rashes      PHYSICAL EXAM:  GENERAL: NAD, well-developed  HEAD:  Atraumatic, Normocephalic  EYES: EOMI, PERRLA, conjunctiva and sclera clear  NECK: Supple, No JVD  CHEST/LUNG: Clear to auscultation bilaterally; No wheeze  HEART: Regular rate and rhythm; No murmurs, rubs, or gallops  ABDOMEN: Soft, Nontender, Nondistended; Bowel sounds present  EXTREMITIES:, No clubbing, cyanosis, or edema  PSYCH: AAOx3  NEUROLOGY: non-focal  SKIN: No rashes or lesions

## 2018-07-25 NOTE — PROGRESS NOTE ADULT - ASSESSMENT
72 year old man with PMH of HLD and Hernia initially presented to the ED with elevated liver enzymes, found to have incidental cirrhosis and portal vein thrombosis 2/2 to tumor invasion per radiology on U/S. CT angio Abdomen findings consistent with HCC w possible mets; CT Abdomen on 7/23 showed increased upper abdominal ascites with areas of new hyperdensity concerning for hemorrhage in addition to segmental and subsegmental R pulmonary emboli s/p Heparin drip started 7/22. Anticoagulation was d/c; Surgery does not recommend any surgical intervention. IR recommends an MRI liver protocol for further assessment (scheduled today). GI does not recommend anticoagulation at this moment. Heme onc following     1. Hepatic Malignancy: likely HCC  - Diffuse tumor infiltration through the right hepatic lobe and 2 masses in the left hepatic lobe.   - R. pulmonary emboli noted on CT. No DVT on US.  - Elevated AFP ([id number]), CA 19-9: 58  - Hepatitis profile negative  - IR recommends an MRI liver protocol to confirm HCC; no need for biopsy if MRI confirms HCC  - No surgical intervention at the moment  - GI does not recommend anticoagulation at the moment; colonoscopy and EGD recommended  - Heme onc is following    2. Hemoperitoneum on CT: 2/2 to liver masses and Heparin drip (discontinued 7/23 am)  - Hgb dropped 12.2--->9.9---> 8.9 (follow H/H)  - Lactate 2.8, trend  - Pt is mildly tachycardic, afebrile, stable bp  - Repeat EKG comparable to EKG on admission  - Repeat CT abdomen on 7/23 comparable with initial hyperdensity hemoperitoneum  - No anticoagulation at the moment    3. Portal vein thrombosis vs tumor invasion  - GI following  - Heparin drip anticoagulation stopped  - Monitor H:H    4. PE of right lung  - LLE venous duplex negative  - Tachycardic on EKG  - no evidence of coagulopathy  - Heparin d/c due to hemoperitoneum  - c/w SCD for DVT prophylaxis   - monitor respiratory status closely   - EGD for screening varices prior to anticoagulation?      DVT ppx: SCD 72 year old man with PMH of HLD and Hernia initially presented to the ED with elevated liver enzymes, found to have incidental cirrhosis and portal vein thrombosis 2/2 to tumor invasion per radiology on U/S. CT angio Abdomen findings consistent with HCC w possible mets; CT Abdomen on 7/23 showed increased upper abdominal ascites with areas of new hyperdensity concerning for hemorrhage in addition to segmental and subsegmental R pulmonary emboli s/p Heparin drip started 7/22. Anticoagulation was d/c; Surgery does not recommend any surgical intervention. IR recommends an MRI liver protocol for further assessment (scheduled today). GI does not recommend anticoagulation at this moment. Heme onc following     1. Hepatic Malignancy: likely HCC  - Diffuse 15cm tumor infiltration through the right hepatic lobe and 2 masses in the left hepatic lobe.   - R.  segmental and subsegmental pulmonary emboli noted on CT. No DVT on US.  - Elevated AFP ([id number]), CA 19-9: 58  - Hepatitis profile negative  - IR recommends an MRI liver protocol to confirm HCC; no need for biopsy if MRI + AFP confirms HCC  - No surgical intervention at the moment  - GI does not recommend anticoagulation at the moment, future colonoscopy reccomended  - Heme onc is following    2. Hemoperitoneum on CT: 2/2 to liver masses and Heparin drip (discontinued 7/23 am)  - Hgb dropped 12.2--->9.9---> 8.9 (follow H/H)  - Lactate 2.8, 1.5trend  - Pt is mildly tachycardic, afebrile, stable bp  - Repeat EKG comparable to EKG on admission  - Repeat CT abdomen on 7/23 comparable with initial hyperdensity hemoperitoneum  - No anticoagulation at the moment    3. Portal vein thrombosis vs tumor invasion  - GI following  - Heparin drip anticoagulation stopped  - Monitor H:H    4. PE of right lung  - LLE venous duplex negative  - Tachycardic on EKG  - no evidence of coagulopathy  - Heparin d/c due to hemoperitoneum  - c/w SCD for DVT prophylaxis   - monitor respiratory status closely   - EGD for screening varices prior to anticoagulation?      DVT ppx: SCD 72 year old man with PMH of HLD and Hernia initially presented to the ED with elevated liver enzymes, found to have incidental cirrhosis and portal vein thrombosis 2/2 to tumor invasion per radiology on U/S. CT angio Abdomen findings consistent with HCC w possible mets; CT Abdomen on 7/23 showed increased upper abdominal ascites with areas of new hyperdensity concerning for hemorrhage in addition to segmental and subsegmental R pulmonary emboli s/p Heparin drip started 7/22. Anticoagulation was d/c; Surgery does not recommend any surgical intervention. IR recommends an MRI liver protocol for further assessment (scheduled today). GI does not recommend anticoagulation at this moment. Heme onc following     1. Hepatic Malignancy: likely HCC  - Diffuse 15cm tumor infiltration through the right hepatic lobe and 2 masses in the left hepatic lobe.   - R.  segmental and subsegmental pulmonary emboli noted on CT. No DVT on US.  - Elevated AFP ([id number]), CA 19-9: 58  - Hepatitis profile negative  - IR recommends an MRI liver protocol to confirm HCC; no need for biopsy if MRI + AFP confirms HCC  - No surgical intervention at the moment  - GI does not recommend anticoagulation at the moment, future colonoscopy reccomended  - Heme onc is following  -Awaiting MRI Abdomen w/cheyanne Liver protocol today    2. Hemoperitoneum on CT: 2/2 to liver masses and Heparin drip (discontinued 7/23 am)  - Hgb dropped 12.2--->9.9---> 8.9 (follow H/H)  - Lactate 2.8, 1.5trend  - Pt is mildly tachycardic, afebrile, stable bp  - Repeat EKG comparable to EKG on admission  - Repeat CT abdomen on 7/23 comparable with initial hyperdensity hemoperitoneum  - No anticoagulation at the moment    3. Portal vein thrombosis vs tumor invasion  - GI following  - Heparin drip anticoagulation stopped, NO Anticoagulation high risk for recurrent intrabdominal bleed  - Monitor H:H    4. PE of right lung  - LLE venous duplex negative  - Tachycardia improved  - no evidence of coagulopathy  - Heparin d/c due to hemoperitoneum  - c/w SCD for DVT prophylaxis   - monitor respiratory status closely       DVT ppx: SCD

## 2018-07-25 NOTE — PROGRESS NOTE ADULT - SUBJECTIVE AND OBJECTIVE BOX
GI HPI   Chief Complaint:  Patient is a 72y old  Male who presents with a chief complaint of Incidental liver lesions, cirrhosis and portal vein thrombosis on outpatient US (21 Jul 2018 02:26)    72 year old man with a past medical history of HLD and Hernia was getting blood work for hernia surgery found to have increase lever enzymes , sent for us found to have cirrhosis. Patient been complaining of abdominal bloating for about a week, with no pain, associated with non bloody diarrhea , 6 episodes daily, with mucous, not related to food and wakes him up at night. Patient denies any hx of GI bleed in the past. His father has a liver cirrhosis secondary to alcohol use  He denies drinking alcohol or smoking   Denies any hx of liver problem or hepatitis or prior blood transfusions.    Interval Events:   Pt reports his abdominal bloating and diarrhea improved.  Pt denies fever, abdominal pain, nausea, vomiting, hematemesis coffee ground emesis melena, BRBPR.          PAST MEDICAL & SURGICAL HISTORY  Incarcerated right inguinal hernia  CAD (coronary artery disease)  Hypercholesterolemia  CAD S/P percutaneous coronary angioplasty: no stents      ALLERGIES:  No Known Allergies      MEDICATIONS:  MEDICATIONS  (STANDING):    MEDICATIONS  (PRN):  ondansetron    Tablet 4 milliGRAM(s) Oral every 6 hours PRN Nausea and/or Vomiting      REVIEW OF SYSTEMS:    CONSTITUTIONAL: No weakness, fatigue, malaise, fevers or chills, no weight change, appetite change  Throat: No Dysphagia, No Odynophagia  RESPIRATORY: No SOB  CARDIOVASCULAR: No chest pain   Muscloskeletal: no joints pain  NEUROLOGICAL: No syncope or diziness  SKIN: No pruritis  Heme/Lymph: no LN enlargement         VITALS:   T(F): 98.7 (07-25 @ 07:54), Max: 99.8 (07-25 @ 00:26)  HR: 102 (07-25 @ 07:54) (98 - 125)  BP: 128/64 (07-25 @ 07:54) (115/68 - 140/60)  BP(mean): --  RR: 18 (07-25 @ 00:26) (18 - 18)  SpO2: 100% (07-25 @ 06:42) (88% - 100%)        PHYSICAL EXAM:  Gen: Pt looks comfortable  EYES: No scleral icterus   LUNG: Clear to auscultation bilaterally; No rales, rhonchi, wheezing, or rubs  HEART: RRR; No murmurs  ABDOMEN: Soft, +BS, mild epigastric Abdominal Tenderness, No guarding, No Back Sign   Rectal Exam: not performed      Blood Work :                        8.9    8.92  )-----------( 134      ( 25 Jul 2018 06:26 )             27.4     PTT - ( 23 Jul 2018 10:15 )  PTT:40.9<H>  07-25    136  |  100  |  27<H>  ----------------------------<  94  4.3   |  22  |  0.9    Ca    8.0<L>      25 Jul 2018 06:26  Phos  4.0     07-23  Mg     2.0     07-24      CBC -  ( 25 Jul 2018 06:26 )  Hemoglobin : 8.9    CBC -  ( 24 Jul 2018 05:34 )  Hemoglobin : 9.0    CBC -  ( 23 Jul 2018 18:13 )  Hemoglobin : 9.8    CBC -  ( 23 Jul 2018 10:15 )  Hemoglobin : 9.9    CBC -  ( 23 Jul 2018 05:07 )  Hemoglobin : 9.9    CBC -  ( 22 Jul 2018 06:43 )  Hemoglobin : 12.2      LIVER FUNCTIONS - ( 25 Jul 2018 06:26 )  Alb: 2.7 [3.5 - 5.2] / Pro: 5.4 [6.0 - 8.0] / ALK PHOS: 228 [30 - 115] / ALT: 52 [0 - 41] / AST: 222 [0 - 41] / GGT: x     LIVER FUNCTIONS - ( 24 Jul 2018 05:34 )  Alb: 2.8 [3.5 - 5.2] / Pro: 5.3 [6.0 - 8.0] / ALK PHOS: 246 [30 - 115] / ALT: 60 [0 - 41] / AST: 289 [0 - 41] / GGT: x     LIVER FUNCTIONS - ( 23 Jul 2018 10:15 )  Alb: 3.1 [3.5 - 5.2] / Pro: 6.0 [6.0 - 8.0] / ALK PHOS: 300 [30 - 115] / ALT: 75 [0 - 41] / AST: 393 [0 - 41] / GGT: x     LIVER FUNCTIONS - ( 21 Jul 2018 08:32 )  Alb: 3.8 [3.5 - 5.2] / Pro: 7.1 [6.0 - 8.0] / ALK PHOS: 319 [30 - 115] / ALT: 38 [0 - 41] / AST: 143 [0 - 41] / GGT: x     LIVER FUNCTIONS - ( 20 Jul 2018 18:05 )  Alb: 3.5 [3.5 - 5.2] / Pro: 6.9 [6.0 - 8.0] / ALK PHOS: 317 [30 - 115] / ALT: 36 [0 - 41] / AST: 139 [0 - 41] / GGT: x         RADIOLOGY:    < from: CT Angio Abdomen and Pelvis w/ IV Cont (07.23.18 @ 20:58) >  Since July 20, 2018:    1. Unchanged liver lesions, described above, with portal vein thrombosis,   suspicious for metastatic HCC with likely tumor thrombosis. No evidence   of contrast extravasation.    2. Slightly increased free abdominal fluid.    3. Additional findings unchanged.      < end of copied text >

## 2018-07-25 NOTE — DIETITIAN INITIAL EVALUATION ADULT. - MD RECOMMEND
Because patient is not consuming any foods at this time due to diarrhea and decreased appetite, please order (1) Ensure Enlive q8hr (each has 8oz/240mL of fluid). However, will monitor improvement and likely discontinue ensure once patient's appetite improves. Current 1,500mL fluid restriction considered.

## 2018-07-25 NOTE — DIETITIAN INITIAL EVALUATION ADULT. - ENERGY NEEDS
5101-5386 kcal/day (MSJ x 1.1-1.2)  63-72 g/day (0.7-0.9g/kg of ABW for hepatic problem at this time)  per LIP, currently 1500mL fluid restriction

## 2018-07-25 NOTE — DIETITIAN INITIAL EVALUATION ADULT. - DIET TYPE
low fat/DASH/TLC (sodium and cholesterol restricted diet)/1500ml/Patient reported not having any appetite due to ongoing bloating and diarrhea. No appetite to eat. Agreed for supplements.

## 2018-07-25 NOTE — DIETITIAN INITIAL EVALUATION ADULT. - NS FNS WEIGHT CHANGE REASON
unintentional/family reported that since 3 months ago, appetite has been decreased gradually and lost a total of 20# since then due to liver cancer. However, EMR weight documented pt to have 198#. only 15# weight different...

## 2018-07-25 NOTE — PROGRESS NOTE ADULT - ASSESSMENT
72 year old man with a past medical history of HLD and Hernia was getting blood work for hernia surgery found to have increase Liver enzymes ,  found to have cirrhosis. Patient been complaining of abdominal bloating for about a week, with no pain, associated with non bloody diarrhea , 6 episodes daily, with mucous, not related to food and wakes him up at night.   Currently pt reports that his abd symptoms improved.    Newly diagnosed cirrhosis of unclear etiology with hepatic mass likely HCC with thrombus in portal vein    Liver cirrhosis   with portal hypertension feature of splenomegaly but no thrombocytopenia, ascites  No evidence of encephalopathy , coagulopathy  Etiology - unclear, denies alcohol abuse,  viral hepatitis panel negative check hep c PCR  Check [facility]D work up ( ABBI, AMA, ASMA, SPEP, Ceruloplasmin, A1AT, ferritin, transferin saturation, ANTI LKM1)  Outpatient follow up in liver clinic     Hepatic Mass with underlying liver cirrhosis and elevated AFP  likely Multifocal  HCC  Awaiting MRI abdomen with liver protocol  follow up with Oncology for further management     Acute Anemia  S/p Anticoagulation with IV heparin  Likely sec to intrabdominal bleed from tumor  Anticoagulation was discontinued and Hb stable    Portal vein tumor thrombus   follow up with vascular and oncology    Acute PE    Discussed with Hospitalist . As per him there is no plan to anticoagulate the patient as pt is high risk of intraabdominal bleed.  Thus EGD screening for varices can be done as an outpatient.     Recall us PRN     Discussed with attending. 72 year old man with a past medical history of HLD and Hernia was getting blood work for hernia surgery found to have increase Liver enzymes ,  found to have cirrhosis. Patient been complaining of abdominal bloating for about a week, with no pain, associated with non bloody diarrhea , 6 episodes daily, with mucous, not related to food and wakes him up at night.   Currently pt reports that his abd symptoms improved.    Newly diagnosed cirrhosis of unclear etiology with hepatic mass likely HCC with thrombus in portal vein    Liver cirrhosis   with portal hypertension feature of splenomegaly but no thrombocytopenia, ascites  No evidence of encephalopathy , coagulopathy  Etiology - unclear, denies alcohol abuse,  viral hepatitis panel negative check hep c PCR  Check [facility]D work up ( ABBI, AMA, ASMA, SPEP, Ceruloplasmin, A1AT, ferritin, transferin saturation, ANTI LKM1)  Outpatient follow up in liver clinic     Hepatic Mass with underlying liver cirrhosis and elevated AFP  likely Multifocal  HCC  Awaiting MRI abdomen with liver protocol  follow up with Oncology for further management     Acute Anemia  S/p Anticoagulation with IV heparin  Likely sec to intrabdominal bleed from tumor  Anticoagulation was discontinued and Hb stable    Portal vein tumor thrombus   follow up with vascular and oncology    Acute PE    Discussed with Hospitalist . As per him there is no plan to anticoagulate the patient as pt is high risk of intraabdominal bleed.  Thus EGD screening for varices can be done as an outpatient.     Recall us PRN     .

## 2018-07-25 NOTE — PROGRESS NOTE ADULT - SUBJECTIVE AND OBJECTIVE BOX
24H events:    Patient is a 72y old Male who presents with a chief complaint of Incidental liver lesions, cirrhosis and portal vein thrombosis on outpatient US (21 Jul 2018 02:26)    Primary diagnosis of Portal vein thrombosis secondary to 15.cm HCC invasion, likely tumor Thrombus    Today is hospital day 3d. Patient was examined at bedside this morning. Overnight pt desaturated to 86-88% on RA; put on 2L O2 by NC with improved saturation. He denies any n/v, SOB, CP, focal weakness,  but admits to continued bouts of watery diarrhea. Patient remains tachycardic (newly diagnosed R PE on CT), currently not on any anticoagulation (SCD in place). Repeat abdominal CT on 7/23 was comparable to CT 7/23 showing unchanged diffuse hyperenhancement. Pt H:H is stable currently with slight drop from yesterday (9.8-->9.0). As per surgery evaluation 7/23, no further surgical intervention was recommended at the moment. IR will be consulted for possible treatment options and optimal imaging. Vascular surgery reevaluated the patient and recommend to follow GI reccs in regards to anticoagulation. Palliative care will be consulted later for goals of care as this is a new diagnosis for patient and he is currently being offered treatment options.     PAST MEDICAL & SURGICAL HISTORY  Incarcerated right inguinal hernia  CAD (coronary artery disease)  Hypercholesterolemia  CAD S/P percutaneous coronary angioplasty: no stents    ROS - Negative other then the HPI.     SOCIAL HISTORY:  Negative for smoking/alcohol/drug use.     ALLERGIES:  No Known Allergies    MEDICATIONS:  STANDING MEDICATIONS  sodium chloride 0.9%. 1000 milliLiter(s) IV Continuous <Continuous>    PRN MEDICATIONS  ondansetron    Tablet 4 milliGRAM(s) Oral every 6 hours PRN    VITALS:   Vital Signs Last 24 Hrs  T(C): 37.1 (25 Jul 2018 07:54), Max: 37.7 (25 Jul 2018 00:26)  T(F): 98.7 (25 Jul 2018 07:54), Max: 99.8 (25 Jul 2018 00:26)  HR: 102 (25 Jul 2018 07:54) (98 - 125)  BP: 128/64 (25 Jul 2018 07:54) (126/62 - 140/60)  RR: 18 (25 Jul 2018 00:26) (18 - 18)  SpO2: 100% (25 Jul 2018 06:42) (93% - 100%)    LABS:                        9.0    9.75  )-----------( 136      ( 24 Jul 2018 05:34 )             27.2     07-24    134<L>  |  99  |  32<H>  ----------------------------<  91  4.2   |  21  |  1.1    Ca    7.6<L>      24 Jul 2018 05:34  Phos  4.0     07-23  Mg     2.0     07-24    TPro  5.3<L>  /  Alb  2.8<L>  /  TBili  2.1<H>  /  DBili  0.9<H>  /  AST  289<H>  /  ALT  60<H>  /  AlkPhos  246<H>  07-24    PTT - ( 23 Jul 2018 10:15 )  PTT:40.9 sec        RADIOLOGY: CT Abdomen - LIVER LESIONS WITH PORTAL VEIN THROMBOSYS     PHYSICAL EXAM:  GEN: No acute distress,   LUNGS/ CHEST : Mild decrease in breath sounds on Right; no acute respiratory distress; no wheezing, crackles or rhonci  HEART/ CVS : S1/S2 present. RRR.   ABD: Soft, non-tender, mildly distended. Moderately hyperactive BS present  NEURO: AAOX3  Skin - pale in color

## 2018-07-25 NOTE — DIETITIAN INITIAL EVALUATION ADULT. - PHYSICAL APPEARANCE
well nourished/BMI is 28.5 BMI is 28.5.  Patient almost met (but did not) the malnutrition criteria. I have confirmed with family, pt ate between 60-70% a week prior to admission and also the weight loss is likely 7%, not >7.5%. Therefore, pt does not meet malnutrition/well nourished

## 2018-07-25 NOTE — PROGRESS NOTE ADULT - ASSESSMENT
# Most likely Multifocal HCC with largest lesion about 15 cm with , with thrombosis of the right  intrahepatic and distal extrahepatic portal veins ( with possible tumor thrombus, invasion?)   possible M1 disease he has enlarged portacaval lymph nodes   measuring up to 2.1 x 1.7 cm.  Granted the node may be reactive.  But if malignant, he would have Stage LISA disease his child Nicole is most likely a B7 ( his albumin is falling from normal, dilutional?, and Bili is just bellow or over 2) given that he has Portal Invasion/thrombosis and possible extrahepatic spread he would he [facility] liver cancer stage C, advance   -for MR liver protocol to confirm diagnosis if characteristic, no need for biopsy as well as to clarify  tumor spread/invasion?  -will discuss with IR  post MR liver, if advance stage  C than would favor systemic therapy only,   Sorafenib, maybe lenvatinib , If not may offer liver directed therapy first  if candidate but suspect advance disease   -no a candidate for surgery or transplant  -Will recalculate his Child Pug score once labs stabilize     #PE due to malignancy   -unable to anticoagulate due to intra abdominal bleed possible from tumor, high risk    #Cirrhosis due to BURROWS?  -hep b and c negative, rest of work up pending  -GI is following EGD  will be done as outpatient    #Acute Anemia   -from intra abdominal bleed from tumor is suspected      Once we see MR liver will proceed from there

## 2018-07-25 NOTE — PROGRESS NOTE ADULT - ASSESSMENT
72 year old man with PMH of HLD and Hernia initially presented to the ED with elevated liver enzymes, found to have incidental cirrhosis and portal vein thrombosis 2/2 to tumor invasion per radiology on U/S. CT angio Abdomen findings consistent with HCC w possible mets; CT Abdomen on 7/23 showed increased upper abdominal ascites with areas of new hyperdensity concerning for hemorrhage in addition to segmental and subsegmental R pulmonary emboli. Due to drop in Hb and possible hemoperitoneum, anticoagulation was d/c; Surgery does not recommend any surgical intervention. IR recommends an MRI liver protocol for further assessment. Awaiting GI recommendations for anticoagulation.     1. Hepatic Malignancy: likely HCC  - Diffuse tumor infiltration through the right hepatic lobe and 2 masses in the left hepatic lobe.   - R. pulmonary emboli noted on CT. No DVT on US.  - Elevated AFP ([id number]), CA 19-9: 58  - Hepatitis profile negative  - IR recommends an MRI liver protocol to confirm HCC; no need for biopsy if MRI confirms HCC  - No surgical intervention at the moment  - IR consulted for treatment options, GI reccs for anticoagulation  - Heme onc is following    2. Hemoperitoneum on CT: 2/2 to liver masses and Heparin drip (discontinued 7/23 am)  - Hgb dropped 12.2--->9.9---> 9.0   - Lactate 2.8, trend  - Pt was tachycardic, afebrile, stable bp  - Repeat EKG comparable to EKG on admission  - Repeat CT abdomen on 7/23 comparable with initial hyperdensity hemoperitoneum  - Vascular surgery recommended GI clearance prior to restarting anticoagulation    3. Portal vein thrombosis vs tumor invasion  - GI following  - Heparin drip anticoagulation stopped, high risk to bleed.   - Monitor H:H    4. Acute PE of right lung  - LLE venous duplex negative  - Heparin d/c due to hemoperitoneum  - c/w SCD for DVT prophylaxis   - monitor respiratory status closely   - EGD for screening varices prior to anticoagulation?   D/W GI no procedure now.   No anticoagulation now. very high risk to bleed. HB dropped already few points     Oncology d/w. Planning for treatment in near future. Await liver protocol MRI of the abdomen to determine further treatment options.   Has lung metastasis also     DVT ppx: SCD  Diet: Dash diet

## 2018-07-25 NOTE — DIETITIAN INITIAL EVALUATION ADULT. - PT NOT SOURCE
Poor po- family at bedside. currently with no edema. Skin intact. LBM 7/25 7x times per pt report diarrhea./other (specify)

## 2018-07-26 ENCOUNTER — TRANSCRIPTION ENCOUNTER (OUTPATIENT)
Age: 73
End: 2018-07-26

## 2018-07-26 VITALS
DIASTOLIC BLOOD PRESSURE: 67 MMHG | TEMPERATURE: 98 F | HEART RATE: 100 BPM | SYSTOLIC BLOOD PRESSURE: 141 MMHG | RESPIRATION RATE: 16 BRPM

## 2018-07-26 PROBLEM — I25.10 ATHEROSCLEROTIC HEART DISEASE OF NATIVE CORONARY ARTERY WITHOUT ANGINA PECTORIS: Chronic | Status: ACTIVE | Noted: 2018-07-21

## 2018-07-26 PROBLEM — E78.00 PURE HYPERCHOLESTEROLEMIA, UNSPECIFIED: Chronic | Status: ACTIVE | Noted: 2018-07-21

## 2018-07-26 PROBLEM — K40.30 UNILATERAL INGUINAL HERNIA, WITH OBSTRUCTION, WITHOUT GANGRENE, NOT SPECIFIED AS RECURRENT: Chronic | Status: ACTIVE | Noted: 2018-07-21

## 2018-07-26 LAB
A1AT SERPL-MCNC: 325 MG/DL — HIGH (ref 90–200)
ALBUMIN SERPL ELPH-MCNC: 2.6 G/DL — LOW (ref 3.5–5.2)
ALP SERPL-CCNC: 239 U/L — HIGH (ref 30–115)
ALT FLD-CCNC: 46 U/L — HIGH (ref 0–41)
ANION GAP SERPL CALC-SCNC: 17 MMOL/L — HIGH (ref 7–14)
APTT BLD: 30.1 SEC — SIGNIFICANT CHANGE UP (ref 27–39.2)
AST SERPL-CCNC: 169 U/L — HIGH (ref 0–41)
BASOPHILS # BLD AUTO: 0.01 K/UL — SIGNIFICANT CHANGE UP (ref 0–0.2)
BASOPHILS NFR BLD AUTO: 0.1 % — SIGNIFICANT CHANGE UP (ref 0–1)
BILIRUB DIRECT SERPL-MCNC: 0.8 MG/DL — HIGH (ref 0–0.2)
BILIRUB INDIRECT FLD-MCNC: 1.1 MG/DL — SIGNIFICANT CHANGE UP (ref 0.2–1.2)
BILIRUB SERPL-MCNC: 1.9 MG/DL — HIGH (ref 0.2–1.2)
BUN SERPL-MCNC: 22 MG/DL — HIGH (ref 10–20)
CALCIUM SERPL-MCNC: 8.1 MG/DL — LOW (ref 8.5–10.1)
CERULOPLASMIN SERPL-MCNC: 44 MG/DL — SIGNIFICANT CHANGE UP (ref 20–60)
CHLORIDE SERPL-SCNC: 96 MMOL/L — LOW (ref 98–110)
CO2 SERPL-SCNC: 22 MMOL/L — SIGNIFICANT CHANGE UP (ref 17–32)
CREAT SERPL-MCNC: 0.8 MG/DL — SIGNIFICANT CHANGE UP (ref 0.7–1.5)
EOSINOPHIL # BLD AUTO: 0.1 K/UL — SIGNIFICANT CHANGE UP (ref 0–0.7)
EOSINOPHIL NFR BLD AUTO: 1.4 % — SIGNIFICANT CHANGE UP (ref 0–8)
FERRITIN SERPL-MCNC: 269 NG/ML — SIGNIFICANT CHANGE UP (ref 30–400)
GLUCOSE SERPL-MCNC: 119 MG/DL — HIGH (ref 70–99)
HCT VFR BLD CALC: 28.7 % — LOW (ref 42–52)
HCV RNA SERPL NAA DL=5-ACNC: SIGNIFICANT CHANGE UP IU/ML
HCV RNA SPEC NAA+PROBE-LOG IU: SIGNIFICANT CHANGE UP LOGIU/ML
HGB BLD-MCNC: 9.3 G/DL — LOW (ref 14–18)
IMM GRANULOCYTES NFR BLD AUTO: 0.6 % — HIGH (ref 0.1–0.3)
INR BLD: 1.5 RATIO — HIGH (ref 0.65–1.3)
LYMPHOCYTES # BLD AUTO: 0.76 K/UL — LOW (ref 1.2–3.4)
LYMPHOCYTES # BLD AUTO: 10.9 % — LOW (ref 20.5–51.1)
MCHC RBC-ENTMCNC: 30.1 PG — SIGNIFICANT CHANGE UP (ref 27–31)
MCHC RBC-ENTMCNC: 32.4 G/DL — SIGNIFICANT CHANGE UP (ref 32–37)
MCV RBC AUTO: 92.9 FL — SIGNIFICANT CHANGE UP (ref 80–94)
MITOCHONDRIA AB SER-ACNC: SIGNIFICANT CHANGE UP
MONOCYTES # BLD AUTO: 0.79 K/UL — HIGH (ref 0.1–0.6)
MONOCYTES NFR BLD AUTO: 11.3 % — HIGH (ref 1.7–9.3)
NEUTROPHILS # BLD AUTO: 5.27 K/UL — SIGNIFICANT CHANGE UP (ref 1.4–6.5)
NEUTROPHILS NFR BLD AUTO: 75.7 % — HIGH (ref 42.2–75.2)
NRBC # BLD: 0 /100 WBCS — SIGNIFICANT CHANGE UP (ref 0–0)
PLATELET # BLD AUTO: 121 K/UL — LOW (ref 130–400)
POTASSIUM SERPL-MCNC: 4.2 MMOL/L — SIGNIFICANT CHANGE UP (ref 3.5–5)
POTASSIUM SERPL-SCNC: 4.2 MMOL/L — SIGNIFICANT CHANGE UP (ref 3.5–5)
PROT SERPL-MCNC: 5.4 G/DL — LOW (ref 6–8)
PROT SERPL-MCNC: 6 G/DL — SIGNIFICANT CHANGE UP (ref 6–8.3)
PROT SERPL-MCNC: 6 G/DL — SIGNIFICANT CHANGE UP (ref 6–8.3)
PROTHROM AB SERPL-ACNC: 16.1 SEC — HIGH (ref 9.95–12.87)
RBC # BLD: 3.09 M/UL — LOW (ref 4.7–6.1)
RBC # FLD: 14.7 % — HIGH (ref 11.5–14.5)
SMOOTH MUSCLE AB SER-ACNC: ABNORMAL
SODIUM SERPL-SCNC: 135 MMOL/L — SIGNIFICANT CHANGE UP (ref 135–146)
WBC # BLD: 6.97 K/UL — SIGNIFICANT CHANGE UP (ref 4.8–10.8)
WBC # FLD AUTO: 6.97 K/UL — SIGNIFICANT CHANGE UP (ref 4.8–10.8)

## 2018-07-26 RX ORDER — ASPIRIN/CALCIUM CARB/MAGNESIUM 324 MG
1 TABLET ORAL
Qty: 0 | Refills: 0 | COMMUNITY

## 2018-07-26 RX ORDER — ATORVASTATIN CALCIUM 80 MG/1
40 TABLET, FILM COATED ORAL
Qty: 0 | Refills: 0 | COMMUNITY

## 2018-07-26 RX ADMIN — SODIUM CHLORIDE 50 MILLILITER(S): 9 INJECTION, SOLUTION INTRAVENOUS at 11:35

## 2018-07-26 NOTE — DISCHARGE NOTE ADULT - CARE PROVIDER_API CALL
Faisal Landon), Internal Medicine  [address] [place] [address]  Phone: [phone]  Fax: [phone]

## 2018-07-26 NOTE — CONSULT NOTE ADULT - ATTENDING COMMENTS
I agree with findings and plan as above.
Pt with multifocal disease, cirrhosis and portal vein thrombosis.   Unlikely to be resectable.   Would suggest, palliative options including chemotherapy and IR.
Would fully AC pt for acute portal vein thrombosis.  GI workup for HCC
He was seen and examined. Agree with residents note. He states he has been loosing weigh about 30 lbs. Has anorexia. He does remain active and his ECOG is 0-1.  He denies any bleeding.    Plan:  # Most likely Multifocal HCC with largest lesion about 15 cm with , with thrombosis of the right  intrahepatic and distal extrahepatic portal veins ( with possible tumor thrombus, invasion?)   possible M1 disease he has enlarged portacaval lymph nodes   measuring up to 2.1 x 1.7 cm.  Granted the node may be reactive.  But if malignant, he would have Stage LISA disease his child Nicole is most likely a B7 ( his albumin is falling from normal, dilutional?, and Bili is just bellow or over 2) given that he has Portal Invasion and possible extrahepatic spread he would he [facility] liver cancer stage C, advance   -for MR liver protocol to confirm diagnosis if characteristic, no need for biopsy as well as to clarify  tumor spread/invasion?  -will discuss with IR  post MR liver, if advance stage  C than would favor systemic therapy only,   Sorafenib, maybe lenvatinib , If not may offer liver directed therapy first  if candidate but suspect advance disease   -no a candidate for surgery or transplant    #PE due to malignancy   -unable to anticoagulate due to intra abdominal bleed possible from tumor, high risk    #Cirrhosis due to BURROWS?  -hep b and c negative, rest of work up pending  -GI is following EGD is a good idea to look and possibly treat varices     #Acute Anemia   -from intra abdominal bleed from tumor is suspected    Had a long discussion with patient and family, we went into all the treatment options regarding advance HCC, regional therapy, (TARE or TACE if possible,  Radiation, systemic therapy, sequencing of treatment and controversy in options and sequencing will locoregional therapy followed by systemic improve survival? some studies are present positive (Gastrointestinal Cancers Symposium (GICS) 2018. Abstract 206, presented January 19, 2018  progression-free survival (PFS) was significantly longer for patients who received combination treatment as compared to those treated with TACE alone (25.2 months vs 13.5 month) and  negative ( DOI: https://doi.org/10.1016/-[id number])[id number] TACE followed by sorafenib did not improve PFS compared to TACE alone) Granted different patient  population and unclear if any of these apply to [place]

## 2018-07-26 NOTE — PROGRESS NOTE ADULT - ATTENDING COMMENTS
Pt with multifocal disease, cirrhosis and portal vein thrombosis.   Unlikely to be resectable.   Would suggest, palliative options including chemotherapy and IR.
He was seen and examined.  Agree with above, exceptions bellow  His MR of liver showed < from: MR Abdomen w/wo IV Cont (07.25.18 @ 16:38) >    Nodular liver parenchyma and contour,   compatible with cirrhosis. Infiltrative arterially enhancing tumor is   noted throughout the right hepatic lobe, with washout on portal venous   phase images and persistent capsular enhancement. Conglomerate right   hepatic tumor measures 15.5 x 10.0 x 17.2 cm. Additional foci of tumor   within the left hepatic lobe measure up to 1.4 x 1.1 cm within hepatic   segment 4A/2. Tumor thrombus is noted within the main portal vein and   extending into the anterior and posterior sectors right portal vein as   well as the proximal left portal vein. The distal left portal vein is   attenuated, however is patent. Tumor thrombus is also noted within a   branch of the right hepatic vein, extending to the IVC. Gallbladder is   contracted, with cholelithiasis.    Multiple overall unchanged in enlarged upper abdominal   lymph nodes, measuring up to 1.8 x 1.8 cm in the periportal region.    I  discussed with IR, His tumor  appears to be characteristic for HCC thus no biopsy is needed and  given the above finding he has advance disease and would unlikely to benefit from liver directed therapy nor is safe given the portal vein thrombosis and Bili as high as 2.5.    Plan:   Multifocal HCC with most likely  Stage LISA disease his child Nicole B7-8 he is  [facility] cancer stage C, advance   -no a candidate for liver Directed therapy  -he can follow up as outpatient to start Nexavar    #PE due to malignancy  and thrombosis in portal vein and tumor thrombi in IVC  -unable to anticoagulate due to intra abdominal bleed with anticoagulation  possible from tumor, high risk    #Cirrhosis due to BURROWS?  -hep b and c negative, rest of work up pending  -GI is following EGD  will be done as outpatient    #Acute Anemia   -from intra abdominal bleed from tumor is suspected    #They asked about prognosis I informed  them that median survival with systemic therapy in clinical trials range from 8-12 months depending on study and second line treatments if a candidate in future. They are aware he is at risk for sudden death due to a PE since not on anticoagulation
Patient seen and examined and d/w daughter and wife.   Gave clear idea of the extent of the disease and prognosis.   D/W Daughter about DNR/ DNI as a start.   Family will think about the DNR and need to d/w other family members. until then full code.

## 2018-07-26 NOTE — PROGRESS NOTE ADULT - ASSESSMENT
72 year old man with PMH of HLD and Hernia initially presented to the ED with elevated liver enzymes, found to have incidental cirrhosis and portal vein thrombosis 2/2 to tumor invasion per radiology on U/S. CT angio Abdomen findings consistent with HCC w possible mets; CT Abdomen on 7/23 showed increased upper abdominal ascites with areas of new hyperdensity concerning for hemorrhage in addition to segmental and subsegmental R pulmonary emboli s/p Heparin drip started 7/22. Anticoagulation was d/c; Surgery does not recommend any surgical intervention. IR recommends an MRI liver protocol for further assessment (scheduled today). GI does not recommend anticoagulation at this moment. Heme onc following     1. Hepatic Malignancy: Confirmed HCC by MRI  - Diffuse 15cm tumor infiltration through the right hepatic lobe and 2 masses in the left hepatic lobe.   - R.  segmental and subsegmental pulmonary emboli noted on CT. No DVT on US.  - Elevated AFP ([id number]), CA 19-9: 58  - Hepatitis profile negative  - IR recommends an MRI liver protocol to confirm HCC; no need for biopsy if MRI + AFP confirms HCC  - No surgical intervention at the moment  - GI does not recommend anticoagulation at the moment, future colonoscopy reccomended  - Heme onc is following  -Awaiting MRI Abdomen w/cheyanne Liver protocol today    2. Hemoperitoneum on CT: 2/2 to liver masses and Heparin drip (discontinued 7/23 am)  - Hgb dropped 12.2--->9.9---> 8.9 (follow H/H)  - Lactate 2.8, 1.5trend  - Pt is mildly tachycardic, afebrile, stable bp  - Repeat EKG comparable to EKG on admission  - Repeat CT abdomen on 7/23 comparable with initial hyperdensity hemoperitoneum  - No anticoagulation at the moment    3. Portal vein thrombosis vs tumor invasion  - GI following  - Heparin drip anticoagulation stopped, NO Anticoagulation high risk for recurrent intrabdominal bleed  - Monitor H:H    4. PE of right lung  - LLE venous duplex negative  - Tachycardia improved  - no evidence of coagulopathy  - Heparin d/c due to hemoperitoneum  - c/w SCD for DVT prophylaxis   - monitor respiratory status closely       DVT ppx: SCD 72 year old man with PMH of HLD and Hernia initially presented to the ED with elevated liver enzymes, found to have incidental cirrhosis and portal vein thrombosis 2/2 to tumor invasion per radiology on U/S. CT angio Abdomen findings consistent with HCC w possible mets; CT Abdomen on 7/23 showed increased upper abdominal ascites with areas of new hyperdensity concerning for hemorrhage in addition to segmental and subsegmental R pulmonary emboli s/p Heparin drip started 7/22. Anticoagulation was d/c; Surgery does not recommend any surgical intervention. MRI on 7/26 highly suggestive of HCC; IR does not recommend radioembolization due to high risk of hepatic failure. No anticoagulation at the moment due to risk of bleeding. Currently awaiting Heme onc recommendations for possible systemic therapy.       1. Hepatic Malignancy: HCC by highly suggestive by MRI  - Hepatic tumor (15.5 x 10x 17.2 cm) suspicious for HCC. Tumor thrombus within main portal vein extending to anterior and    posterior sectors of yhe R portal vein, proximal left portal vein. Tumor thrombus within R hepatic vein extends to ICV.  - R. segmental and subsegmental pulmonary emboli noted on CT. No DVT on US.  - Elevated AFP ([id number]), CA 19-9: 58, Alpha 1 antitrypsin 325 (elevated)  - Hepatitis profile negative  - AS per IR, MRI is highly suggestive of HCC; no need for biopsy if MRI + AFP confirms HCC  - Pt is not a candidate for Y90 radio-embolization due to high risk of Fulminant hepatic failure; not a candidate for transplant  - No surgical intervention at the moment  - GI does not recommend anticoagulation at the moment  - Heme onc is following; awaiting recommendations for possible systemic therapy    2. Hemoperitoneum on CT: 2/2 to liver masses and Heparin drip (discontinued 7/23 am)  - Hgb dropped 12.2--->9.9---> 9.3 (follow H/H)  - Lactate 2.8, 1.5 trend  - Pt is mildly tachycardic, afebrile, stable bp  - Repeat EKG comparable to EKG on admission  - Repeat CT abdomen on 7/23 comparable with initial hyperdensity hemoperitoneum  - No anticoagulation at the moment    3. Portal vein thrombosis vs tumor invasion  - GI following  - Heparin drip anticoagulation stopped, NO Anticoagulation high risk for recurrent intrabdominal bleed  - Monitor H:H    4. PE of right lung  - LLE venous duplex negative  - Tachycardia improved  - no evidence of coagulopathy  - Heparin d/c due to hemoperitoneum  - c/w SCD for DVT prophylaxis   - monitor respiratory status closely       DVT ppx: SCD 72 year old man with PMH of HLD and Hernia initially presented to the ED with elevated liver enzymes, found to have incidental cirrhosis and portal vein thrombosis 2/2 to tumor invasion per radiology on U/S. CT angio Abdomen findings consistent with HCC w possible mets; CT Abdomen on 7/23 showed increased upper abdominal ascites with areas of new hyperdensity concerning for hemorrhage in addition to segmental and subsegmental R pulmonary emboli s/p Heparin drip started 7/22. Anticoagulation was d/c; Surgery does not recommend any surgical intervention. MRI on 7/26 highly suggestive of HCC; IR does not recommend radioembolization due to high risk of hepatic failure. No anticoagulation at the moment due to risk of bleeding. Currently awaiting Heme onc recommendations for possible systemic therapy.       1. Hepatic Malignancy: HCC by highly suggestive by MRI  - Hepatic tumor (15.5 x 10x 17.2 cm) suspicious for HCC. Tumor thrombus within main portal vein extending to anterior and  posterior sectors of the R portal vein, proximal left portal vein. Tumor thrombus within R hepatic vein extends to ICV.  - R. segmental and subsegmental pulmonary emboli noted on CT. No DVT on US.  - Elevated AFP ([id number]), CA 19-9: 58, Alpha 1 antitrypsin 325 (elevated)  - Hepatitis profile negative  - AS per IR, MRI is highly suggestive of HCC; no need for biopsy if MRI + AFP confirms HCC  - Pt is not a candidate for Y90 radio-embolization due to high risk of Fulminant hepatic failure; not a candidate for transplant  - No surgical intervention at the moment  - GI does not recommend anticoagulation at the moment  - Heme onc is following; awaiting recommendations for possible systemic therapy, will follow up outpatient    2. Hemoperitoneum on CT: 2/2 to liver masses and Heparin drip (discontinued 7/23 am)  - Hgb dropped 12.2--->9.9---> 9.3 (follow H/H)  - Lactate 2.8, 1.5 trend  - Pt is mildly tachycardic, afebrile, stable bp  - Repeat EKG comparable to EKG on admission  - Repeat CT abdomen on 7/23 comparable with initial hyperdensity hemoperitoneum  - No anticoagulation at the moment    3. Portal vein thrombosis vs tumor invasion  - GI following  - Heparin drip anticoagulation stopped, NO Anticoagulation high risk for recurrent intrabdominal bleed  - Monitor H:H    4. PE of right lung  - LLE venous duplex negative  - Tachycardia improved  - no evidence of coagulopathy  - Heparin d/c due to hemoperitoneum  - c/w SCD for DVT prophylaxis   - monitor respiratory status closely       DVT ppx: SCD

## 2018-07-26 NOTE — PROGRESS NOTE ADULT - SUBJECTIVE AND OBJECTIVE BOX
Patient is a 72y old  Male who presents with a chief complaint of Incidental liver lesions, cirrhosis and portal vein thrombosis on outpatient US (21 Jul 2018 02:26)      Subjective:      Vital Signs Last 24 Hrs  T(C): 36.1 (25 Jul 2018 17:20), Max: 37.1 (25 Jul 2018 07:54)  T(F): 96.9 (25 Jul 2018 17:20), Max: 98.7 (25 Jul 2018 07:54)  HR: 112 (25 Jul 2018 17:20) (102 - 112)  BP: 145/74 (25 Jul 2018 17:20) (128/64 - 145/74)  BP(mean): --  RR: 18 (25 Jul 2018 17:20) (18 - 18)  SpO2: 95% (25 Jul 2018 17:34) (95% - 100%)    PHYSICAL EXAM  General: adult in NAD  HEENT: clear oropharynx, anicteric sclera, pink conjunctiva  Neck: supple  CV: normal S1/S2 with no murmur rubs or gallops  Lungs: positive air movement b/l ant lungs,clear to auscultation, no wheezes, no rales  Abdomen: soft non-tender non-distended, no hepatosplenomegaly  Ext: no clubbing cyanosis or edema  Skin: no rashes and no petechiae  Neuro: alert and oriented X 4, no focal deficits    MEDICATIONS  (STANDING):  dextrose 5% + sodium chloride 0.9%. 1000 milliLiter(s) (50 mL/Hr) IV Continuous <Continuous>    MEDICATIONS  (PRN):  ondansetron    Tablet 4 milliGRAM(s) Oral every 6 hours PRN Nausea and/or Vomiting      LABS:                          8.9    8.92  )-----------( 134      ( 25 Jul 2018 06:26 )             27.4         Mean Cell Volume : 93.2 fL  Mean Cell Hemoglobin : 30.3 pg  Mean Cell Hemoglobin Concentration : 32.5 g/dL  Auto Neutrophil # : x  Auto Lymphocyte # : x  Auto Monocyte # : x  Auto Eosinophil # : x  Auto Basophil # : x  Auto Neutrophil % : x  Auto Lymphocyte % : x  Auto Monocyte % : x  Auto Eosinophil % : x  Auto Basophil % : x      Serial CBC's  07-25 @ 06:26  Hct-27.4 / Hgb-8.9 / Plat-134 / RBC-2.94 / WBC-8.92  Serial CBC's  07-24 @ 05:34  Hct-27.2 / Hgb-9.0 / Plat-136 / RBC-2.95 / WBC-9.75  Serial CBC's  07-23 @ 18:13  Hct-29.7 / Hgb-9.8 / Plat-206 / RBC-3.23 / WBC-14.17  Serial CBC's  07-23 @ 10:15  Hct-30.3 / Hgb-9.9 / Plat-192 / RBC-3.33 / WBC-14.30  Serial CBC's  07-23 @ 05:07  Hct-29.6 / Hgb-9.9 / Plat-224 / RBC-3.25 / WBC-16.44  Serial CBC's  07-22 @ 06:43  Hct-37.2 / Hgb-12.2 / Plat-248 / RBC-4.09 / WBC-11.65      07-25    136  |  100  |  27<H>  ----------------------------<  94  4.3   |  22  |  0.9    Ca    8.0<L>      25 Jul 2018 06:26    TPro  5.4<L>  /  Alb  2.7<L>  /  TBili  2.5<H>  /  DBili  1.2<H>  /  AST  222<H>  /  ALT  52<H>  /  AlkPhos  228<H>  07-25                                  BLOOD SMEAR INTERPRETATION:       RADIOLOGY & ADDITIONAL STUDIES: Patient is a 72y old  Male who presents with a chief complaint of Incidental liver lesions, cirrhosis and portal vein thrombosis on outpatient US (21 Jul 2018 02:26)    Subjective: Patient underwent MRI liver protocol yesterday.  He feels deconditioned from being in the hospital and has not been eating much, secondary to lack of appetite.  No nausea, vomiting, or abdominal pain.  Diarrhea has resolved.    Vital Signs Last 24 Hrs  T(C): 36.1 (25 Jul 2018 17:20), Max: 37.1 (25 Jul 2018 07:54)  T(F): 96.9 (25 Jul 2018 17:20), Max: 98.7 (25 Jul 2018 07:54)  HR: 112 (25 Jul 2018 17:20) (102 - 112)  BP: 145/74 (25 Jul 2018 17:20) (128/64 - 145/74)  BP(mean): --  RR: 18 (25 Jul 2018 17:20) (18 - 18)  SpO2: 95% (25 Jul 2018 17:34) (95% - 100%)    PHYSICAL EXAM  General: adult in NAD, sitting comfortably in chair  HEENT: NC/AT  Neck: supple  Abdomen: soft  Ext: no clubbing cyanosis or edema  Skin: no rashes and no petechiae  Neuro: alert and oriented X 4, no focal deficits    MEDICATIONS  (STANDING):  dextrose 5% + sodium chloride 0.9%. 1000 milliLiter(s) (50 mL/Hr) IV Continuous <Continuous>    MEDICATIONS  (PRN):  ondansetron    Tablet 4 milliGRAM(s) Oral every 6 hours PRN Nausea and/or Vomiting      LABS:                          8.9    8.92  )-----------( 134      ( 25 Jul 2018 06:26 )             27.4         Mean Cell Volume : 93.2 fL  Mean Cell Hemoglobin : 30.3 pg  Mean Cell Hemoglobin Concentration : 32.5 g/dL  Auto Neutrophil # : x  Auto Lymphocyte # : x  Auto Monocyte # : x  Auto Eosinophil # : x  Auto Basophil # : x  Auto Neutrophil % : x  Auto Lymphocyte % : x  Auto Monocyte % : x  Auto Eosinophil % : x  Auto Basophil % : x      Serial CBC's  07-25 @ 06:26  Hct-27.4 / Hgb-8.9 / Plat-134 / RBC-2.94 / WBC-8.92  Serial CBC's  07-24 @ 05:34  Hct-27.2 / Hgb-9.0 / Plat-136 / RBC-2.95 / WBC-9.75  Serial CBC's  07-23 @ 18:13  Hct-29.7 / Hgb-9.8 / Plat-206 / RBC-3.23 / WBC-14.17  Serial CBC's  07-23 @ 10:15  Hct-30.3 / Hgb-9.9 / Plat-192 / RBC-3.33 / WBC-14.30  Serial CBC's  07-23 @ 05:07  Hct-29.6 / Hgb-9.9 / Plat-224 / RBC-3.25 / WBC-16.44  Serial CBC's  07-22 @ 06:43  Hct-37.2 / Hgb-12.2 / Plat-248 / RBC-4.09 / WBC-11.65      07-25    136  |  100  |  27<H>  ----------------------------<  94  4.3   |  22  |  0.9    Ca    8.0<L>      25 Jul 2018 06:26    TPro  5.4<L>  /  Alb  2.7<L>  /  TBili  2.5<H>  /  DBili  1.2<H>  /  AST  222<H>  /  ALT  52<H>  /  AlkPhos  228<H>  07-25

## 2018-07-26 NOTE — PROGRESS NOTE ADULT - SUBJECTIVE AND OBJECTIVE BOX
24H events:    Patient is a 72y old Male who presents with a chief complaint of Incidental liver lesions, cirrhosis and portal vein thrombosis on outpatient US (21 Jul 2018 02:26)    Primary diagnosis of Portal vein thrombosis secondary to 15.cm HCC invasion, likely tumor Thrombus    Today is hospital day 3d. Patient was examined at bedside this morning. Overnight pt desaturated to 86-88% on RA; put on 2L O2 by NC with improved saturation. He denies any n/v, SOB, CP, focal weakness,  but admits to continued bouts of watery diarrhea. Patient remains tachycardic (newly diagnosed R PE on CT), currently not on any anticoagulation (SCD in place). Repeat abdominal CT on 7/23 was comparable to CT 7/23 showing unchanged diffuse hyperenhancement. Pt H:H is stable currently with slight drop from yesterday (9.8-->9.0). As per surgery evaluation 7/23, no further surgical intervention was recommended at the moment. IR will be consulted for possible treatment options and optimal imaging. Vascular surgery reevaluated the patient and recommend to follow GI reccs in regards to anticoagulation. Palliative care will be consulted later for goals of care as this is a new diagnosis for patient and he is currently being offered treatment options.     PAST MEDICAL & SURGICAL HISTORY  Incarcerated right inguinal hernia  CAD (coronary artery disease)  Hypercholesterolemia  CAD S/P percutaneous coronary angioplasty: no stents    ROS - Negative other then the HPI.     SOCIAL HISTORY:  Negative for smoking/alcohol/drug use.     ALLERGIES:  No Known Allergies    MEDICATIONS:  STANDING MEDICATIONS  sodium chloride 0.9%. 1000 milliLiter(s) IV Continuous <Continuous>    PRN MEDICATIONS  ondansetron    Tablet 4 milliGRAM(s) Oral every 6 hours PRN  ICU Vital Signs Last 24 Hrs  T(C): 36.3 (26 Jul 2018 07:51), Max: 36.3 (26 Jul 2018 07:51)  T(F): 97.3 (26 Jul 2018 07:51), Max: 97.3 (26 Jul 2018 07:51)  HR: 98 (26 Jul 2018 07:51) (98 - 112)  BP: 127/60 (26 Jul 2018 07:51) (127/60 - 145/74)  BP(mean): --  ABP: --  ABP(mean): --  RR: 18 (26 Jul 2018 07:51) (18 - 18)  SpO2: 95% (25 Jul 2018 17:34) (95% - 95%)                          9.0    9.75  )-----------( 136      ( 24 Jul 2018 05:34 )             27.2     07-24    134<L>  |  99  |  32<H>  ----------------------------<  91  4.2   |  21  |  1.1    Ca    7.6<L>      24 Jul 2018 05:34  Phos  4.0     07-23  Mg     2.0     07-24    TPro  5.3<L>  /  Alb  2.8<L>  /  TBili  2.1<H>  /  DBili  0.9<H>  /  AST  289<H>  /  ALT  60<H>  /  AlkPhos  246<H>  07-24    PTT - ( 23 Jul 2018 10:15 )  PTT:40.9 sec        RADIOLOGY: CT Abdomen - LIVER LESIONS WITH PORTAL VEIN THROMBOSYS     PHYSICAL EXAM:  GEN: No acute distress,   LUNGS/ CHEST : Mild decrease in breath sounds on Right; no acute respiratory distress; no wheezing, crackles or rhonci  HEART/ CVS : S1/S2 present. RRR.   ABD: Soft, non-tender, mildly distended. Moderately hyperactive BS present  NEURO: AAOX3  Skin - pale in color

## 2018-07-26 NOTE — DISCHARGE NOTE ADULT - CARE PLAN
Principal Discharge DX:	Portal vein thrombosis secondary to HCC invasion  Goal:	Maintain healthy diet, good mobility, and close followup with Oncologist  Assessment and plan of treatment:	Invasive Hepatocellular Carcinoma 15.5cm x10.0cm x 17.2cm lesion R lobe with 2 lesions in the left lobe and invasion into most parts of the main portal vein and its branches. Elevated AFP [id number]. Patient has an appointment to discuss/begin treatment with Dr. Landon on August 15th 3:00PM. Patient is to stop all Asprin and Statin medication due to liver dysfunction and tumor bleeding risk as he had an episode of hemoperitoneum on this admission with a/c. Did not need transfusion Hb Stable. Patient should increase nutrition and fluid intake at home.

## 2018-07-26 NOTE — PROGRESS NOTE ADULT - SUBJECTIVE AND OBJECTIVE BOX
24H events:    Patient is a 72y old Male who presents with a chief complaint of Incidental liver lesions, cirrhosis and portal vein thrombosis on outpatient US (21 Jul 2018 02:26)    Primary diagnosis of Portal vein thrombosis secondary to  15.cm HCC invasion, likely tumor Thrombus      Today is hospital day 5d. Patient was examined at bedside and continues to experience episodes of nonbloody, watery, diarrhea He also has persistent lower abdominal pain and distension. Pt is currently on 3L NC O2, w SPO2 of 99%. He denies any SOB, CP, palpitations, n/v, or focal weaknesses. Pt MRI on 7/25 confirmed diagnosis of HCC with tumor thrombus expanding to main portal vein, hepatic vein, and IVC. As per IR evaluation, pt is not a candidate for Y90 Radioembolization due to High risk of fulminant hepatic failure. Heme oncology is following pt; awaiting recommendations.    PAST MEDICAL & SURGICAL HISTORY  Incarcerated right inguinal hernia  CAD (coronary artery disease)  Hypercholesterolemia  CAD S/P percutaneous coronary angioplasty: no stents    SOCIAL HISTORY:  Negative for smoking/alcohol/drug use.     ALLERGIES:  No Known Allergies    MEDICATIONS:  STANDING MEDICATIONS  dextrose 5% + sodium chloride 0.9%. 1000 milliLiter(s) IV Continuous <Continuous>    PRN MEDICATIONS  ondansetron    Tablet 4 milliGRAM(s) Oral every 6 hours PRN    VITALS:   T(F): 97.3  HR: 98  BP: 127/60  RR: 18  SpO2: 95%    LABS:                        9.3    6.97  )-----------( 121      ( 26 Jul 2018 06:32 )             28.7     07-26    135  |  96<L>  |  22<H>  ----------------------------<  119<H>  4.2   |  22  |  0.8    Ca    8.1<L>      26 Jul 2018 06:32    TPro  5.4<L>  /  Alb  2.6<L>  /  TBili  1.9<H>  /  DBili  0.8<H>  /  AST  169<H>  /  ALT  46<H>  /  AlkPhos  239<H>  07-26    PT/INR - ( 26 Jul 2018 06:32 )   PT: 16.10 sec;   INR: 1.50 ratio         PTT - ( 26 Jul 2018 06:32 )  PTT:30.1 sec      RADIOLOGY:  MRI 7/25    IMPRESSION:    1.  Since July 23, 2018, overall unchanged infiltrative arterially   enhancing conglomerate right hepatic tumor measuring 15.5 x 10.0 x 17.2   cm, with imaging features suspicious for hepatocellular carcinoma;   additional foci of tumor within the left hepatic lobe measure up to 1.4   cm.    2.  Tumor thrombus within the main portal vein extending to the anterior   and posterior sectors of the right portal vein and proximal left portal   vein; distal left portal vein is attenuated, however is patent.    3.  Tumor thrombus within the right hepatic vein extends to the IVC.      PHYSICAL EXAM:  GEN: No acute distress  LUNGS: Clear to auscultation bilaterally   HEART: S1/S2 present. RRR.   ABD: Soft, non-tender, mildly distended. Bowel sounds present  EXT: 1+ b/l LE, peripheral pulses 2+ b/l  NEURO: AAOX3 24H events:    Patient is a 72y old Male who presents with a chief complaint of Incidental liver lesions, cirrhosis and portal vein thrombosis on outpatient US (21 Jul 2018 02:26)    Primary diagnosis of Portal vein thrombosis secondary to  15.cm HCC invasion, likely tumor Thrombus      Today is hospital day 5d. Patient was examined at bedside and continues to experience episodes of nonbloody, watery, diarrhea He also has persistent lower abdominal pain and distension. Pt is currently on 3L NC O2, w SPO2 of 99%. He was O2 challenged this afternoon with ambulation and saturating 94-99% He denies any SOB, CP, palpitations, n/v, or focal weaknesses. Pt MRI on 7/25 confirmed diagnosis of HCC with tumor thrombus expanding to main portal vein, hepatic vein, and IVC. As per IR evaluation, pt is not a candidate for Y90 Radioembolization due to High risk of fulminant hepatic failure. Heme oncology is following pt he will follow up outpatient for treatment with Dr. Landon first appointment on Aug 15th 3pm.    PAST MEDICAL & SURGICAL HISTORY  Incarcerated right inguinal hernia  CAD (coronary artery disease)  Hypercholesterolemia  CAD S/P percutaneous coronary angioplasty: no stents    ALLERGIES:  No Known Allergies    MEDICATIONS:  STANDING MEDICATIONS  dextrose 5% + sodium chloride 0.9%. 1000 milliLiter(s) IV Continuous <Continuous>    PRN MEDICATIONS  ondansetron    Tablet 4 milliGRAM(s) Oral every 6 hours PRN    VITALS:   T(F): 97.3  HR: 98  BP: 127/60  RR: 18  SpO2: 95%    LABS:                        9.3    6.97  )-----------( 121      ( 26 Jul 2018 06:32 )             28.7     07-26    135  |  96<L>  |  22<H>  ----------------------------<  119<H>  4.2   |  22  |  0.8    Ca    8.1<L>      26 Jul 2018 06:32    TPro  5.4<L>  /  Alb  2.6<L>  /  TBili  1.9<H>  /  DBili  0.8<H>  /  AST  169<H>  /  ALT  46<H>  /  AlkPhos  239<H>  07-26    PT/INR - ( 26 Jul 2018 06:32 )   PT: 16.10 sec;   INR: 1.50 ratio         PTT - ( 26 Jul 2018 06:32 )  PTT:30.1 sec      RADIOLOGY:  MRI 7/25    IMPRESSION:    1.  Since July 23, 2018, overall unchanged infiltrative arterially   enhancing conglomerate right hepatic tumor measuring 15.5 x 10.0 x 17.2   cm, with imaging features suspicious for hepatocellular carcinoma;   additional foci of tumor within the left hepatic lobe measure up to 1.4   cm.    2.  Tumor thrombus within the main portal vein extending to the anterior   and posterior sectors of the right portal vein and proximal left portal   vein; distal left portal vein is attenuated, however is patent.    3.  Tumor thrombus within the right hepatic vein extends to the IVC.      PHYSICAL EXAM:  GEN: No acute distress, Pale  LUNGS: Clear to auscultation bilaterally   HEART: S1/S2 present. RRR.   ABD: Soft, non-tender, mildly distended. Bowel sounds present  EXT: 1+ b/l LE, peripheral pulses 2+ b/l  NEURO: AAOX3

## 2018-07-26 NOTE — CONSULT NOTE ADULT - SUBJECTIVE AND OBJECTIVE BOX
INTERVENTIONAL RADIOLOGY CONSULT:     Procedure Requested:     HPI:  72 year old man with a past medical history of HLD and Hernia presenting for incidental finding of portal vein thrombosis after result of elevated LFTs on regular blood work. Patient reports that he has been having abominal bloating for about 2-3 weeks with mild constipation. PMD did blood work which showed increased LFTs and send him to get an outpatient US which showed multiple masses on liver, unclear if mets or primary liver malignancy and also cirrhosis as well as main portal vein occlusion. No fevers, chills, nausea, vomiting, diarrhea, chest pain, SOB, lower extremity swelling/warmth. Patient reports a 20lb weight loss. Patient reports that he has occasional tremors when he tries to hold something that has started 8 months ago. (21 Jul 2018 02:26)      PAST MEDICAL & SURGICAL HISTORY:  Incarcerated right inguinal hernia  CAD (coronary artery disease)  Hypercholesterolemia  CAD S/P percutaneous coronary angioplasty: no stents      MEDICATIONS  (STANDING):  dextrose 5% + sodium chloride 0.9%. 1000 milliLiter(s) (50 mL/Hr) IV Continuous <Continuous>    MEDICATIONS  (PRN):  ondansetron    Tablet 4 milliGRAM(s) Oral every 6 hours PRN Nausea and/or Vomiting      Allergies    No Known Allergies    Intolerances        Social History:   Smoking: Yes [ ]  No [ ]   ______pk yrs  ETOH  Yes [ ]  No [ ]  Social [ ]  DRUGS:  Yes [ ]  No [ ]  if so what______________    FAMILY HISTORY:  No pertinent family history in first degree relatives      Physical Exam:   Vital Signs Last 24 Hrs  T(C): 36.3 (26 Jul 2018 07:51), Max: 36.3 (26 Jul 2018 07:51)  T(F): 97.3 (26 Jul 2018 07:51), Max: 97.3 (26 Jul 2018 07:51)  HR: 98 (26 Jul 2018 07:51) (98 - 112)  BP: 127/60 (26 Jul 2018 07:51) (127/60 - 145/74)  BP(mean): --  RR: 18 (26 Jul 2018 07:51) (18 - 18)  SpO2: 95% (25 Jul 2018 17:34) (95% - 95%)  Labs:                         9.3    6.97  )-----------( 121      ( 26 Jul 2018 06:32 )             28.7     07-26    135  |  96<L>  |  22<H>  ----------------------------<  119<H>  4.2   |  22  |  0.8    Ca    8.1<L>      26 Jul 2018 06:32    TPro  5.4<L>  /  Alb  2.6<L>  /  TBili  1.9<H>  /  DBili  0.8<H>  /  AST  169<H>  /  ALT  46<H>  /  AlkPhos  239<H>  07-26    PT/INR - ( 26 Jul 2018 06:32 )   PT: 16.10 sec;   INR: 1.50 ratio         PTT - ( 26 Jul 2018 06:32 )  PTT:30.1 sec    Pertinent labs:                      9.3    6.97  )-----------( 121      ( 26 Jul 2018 06:32 )             28.7       07-26    135  |  96<L>  |  22<H>  ----------------------------<  119<H>  4.2   |  22  |  0.8    Ca    8.1<L>      26 Jul 2018 06:32    TPro  5.4<L>  /  Alb  2.6<L>  /  TBili  1.9<H>  /  DBili  0.8<H>  /  AST  169<H>  /  ALT  46<H>  /  AlkPhos  239<H>  07-26      PT/INR - ( 26 Jul 2018 06:32 )   PT: 16.10 sec;   INR: 1.50 ratio         PTT - ( 26 Jul 2018 06:32 )  PTT:30.1 sec    Radiology & Additional Studies:     < from: MR Abdomen w/wo IV Cont (07.25.18 @ 16:38) >    1.  Since July 23, 2018, overall unchanged infiltrative arterially   enhancing conglomerate right hepatic tumor measuring 15.5 x 10.0 x 17.2   cm, with imaging features suspicious for hepatocellular carcinoma;   additional foci of tumor within the left hepatic lobe measure up to 1.4   cm.    2.  Tumor thrombus within the main portal vein extending to the anterior   and posterior sectors of the right portal vein and proximal left portal   vein; distal left portal vein is attenuated, however is patent.    3.  Tumor thrombus within the right hepatic vein extends to the IVC.    < end of copied text >        ASSESSMENT/ PLAN:     Imaging reviewed with enhancement pattern highly suggestive of HCC; given clinical status of this patient (Leticia class C), patient is not a candidate for Y90 Radioembolization given high risk of fulminant hepatic failure post treatment. Recommend further treatment by Oncology. No biopsy needed given imaging features.     Thank you for the courtesy of this consult, please call [phone] with any further questions.

## 2018-07-26 NOTE — DISCHARGE NOTE ADULT - PATIENT PORTAL LINK FT
You can access the [facility] Patient Portal, offered by [facility], by registering with the following website: [email or web address]

## 2018-07-26 NOTE — PROGRESS NOTE ADULT - ASSESSMENT
72 year old man with PMH of HLD and hernia initially presented to the ED with elevated liver enzymes detected on outpatient bloodwork.  He was found to have incidental cirrhosis and portal vein thrombosis 2/2 to tumor invasion per radiology on ultrasound. CT angio abdomen findings consistent with HCC with possible mets.  He was also found to have PE but developed hemoperitoneum when started on heparin drip.    1. Most likely HCC- largest lesion about 15 cm with thrombosis of the right  intrahepatic and distal extrahepatic portal veins ( with possible tumor thrombus, invasion?)  -MR liver protocol performed yesterday.  Pending read.  If characteristic of HCC, no biopsy will be needed.  -Need to discuss with IR post MR liver.  If advance stage C disease, would give sorafenib or lenvatinib.  If patient does not have advanced disease, may offer liver-directed therapy first.  -Patient is not a candidate for surgical resection or liver transplant.    2. PE  -No anticoagulation secondary to hemoperitoneum after starting heparin drip.    3. Cirrhosis  - ? BURROWS.  Patient is negative for hepatitis B and hepatitis C.  Remainder of cirrhosis workup is pending.  - Patient planned for outpatient EGD to look for varices.

## 2018-07-26 NOTE — DISCHARGE NOTE ADULT - OTHER SIGNIFICANT FINDINGS
< from: MR Abdomen w/wo IV Cont (07.25.18 @ 16:38) >    IMPRESSION:    1.  Since July 23, 2018, overall unchanged infiltrative arterially   enhancing conglomerate right hepatic tumor measuring 15.5 x 10.0 x 17.2   cm, with imaging features suspicious for hepatocellular carcinoma;   additional foci of tumor within the left hepatic lobe measure up to 1.4   cm.    2.  Tumor thrombus within the main portal vein extending to the anterior   and posterior sectors of the right portal vein and proximal left portal   vein; distal left portal vein is attenuated, however is patent.    3.  Tumor thrombus within the right hepatic vein extends to the IVC.    4.  Sequela of cirrhosis and portal hypertension including moderate   splenomegaly and small volume abdominal ascites.    < from: CT Angio Abdomen and Pelvis w/ IV Cont (07.23.18 @ 20:58) >  IMPRESSION:    Since July 20, 2018:    1. Unchanged liver lesions, described above, with portal vein thrombosis,   suspicious for metastatic HCC with likely tumor thrombosis. No evidence   of contrast extravasation.    2. Slightly increased free abdominal fluid.    3. Additional findings unchanged.      < end of copied text >      < from: CT Head w/wo IV Cont (07.22.18 @ 10:11) >    1.  Prominence of the posterior nasopharyngeal soft tissues to the left   of midline with blockage of the left fossa Rosenmuller, neoplasm in this   location should be excluded, correlation with physical examination is   recommended.    2.  Cerebral and cerebellar atrophy.    3.  No enhancing lesions to suggest cerebral metastasis.  < end of copied text >

## 2018-07-26 NOTE — DISCHARGE NOTE ADULT - MEDICATION SUMMARY - MEDICATIONS TO STOP TAKING
I will STOP taking the medications listed below when I get home from the hospital:    Aspir 81 oral delayed release tablet  -- 1 tab(s) by mouth once a day    Lipitor  -- 40 milligram(s) by mouth once a day (at bedtime)

## 2018-07-26 NOTE — DISCHARGE NOTE ADULT - PLAN OF CARE
Maintain healthy diet, good mobility, and close followup with Oncologist Invasive Hepatocellular Carcinoma 15.5cm x10.0cm x 17.2cm lesion R lobe with 2 lesions in the left lobe and invasion into most parts of the main portal vein and its branches. Elevated AFP 89480. Patient has an appointment to discuss/begin treatment with Dr. Landon on August 15th 3:00PM. Patient is to stop all Asprin and Statin medication due to liver dysfunction and tumor bleeding risk as he had an episode of hemoperitoneum on this admission with a/c. Did not need transfusion Hb Stable. Patient should increase nutrition and fluid intake at home.

## 2018-07-26 NOTE — PROGRESS NOTE ADULT - ASSESSMENT
72 year old man with PMH of HLD and Hernia initially presented to the ED with elevated liver enzymes, found to have incidental cirrhosis and portal vein thrombosis 2/2 to tumor invasion per radiology on U/S. CT angio Abdomen findings consistent with HCC w possible mets; CT Abdomen on 7/23 showed increased upper abdominal ascites with areas of new hyperdensity concerning for hemorrhage in addition to segmental and subsegmental R pulmonary emboli. Due to drop in Hb and possible hemoperitoneum, anticoagulation was d/c; Surgery does not recommend any surgical intervention. IR recommends an MRI liver protocol for further assessment. Awaiting GI recommendations for anticoagulation.     1. Hepatic Malignancy: likely HCC  - Diffuse tumor infiltration through the right hepatic lobe and 2 masses in the left hepatic lobe.   - R. pulmonary emboli noted on CT. No DVT on US.  - Elevated AFP (67140), CA 19-9: 58  - Hepatitis profile negative  - MRI done - highly suggestive of HCC - do not rec biopsy   - No surgical intervention at the moment  - discussed with Onc Dr Massey - likely systemic tx - [facility] sorefinib at [facility]       2. Hemoperitoneum on CT: 2/2 to liver masses and Heparin drip (discontinued 7/23 am)  - Hgb dropped 12.2--->9.9---> 9.0   - Lactate 2.8, trend  - Pt was tachycardic, afebrile, stable bp  - Repeat EKG comparable to EKG on admission  - Repeat CT abdomen on 7/23 comparable with initial hyperdensity hemoperitoneum  - too high risk for ac    3. Portal vein thrombosis vs tumor invasion  - GI following  - Heparin drip anticoagulation stopped, high risk to bleed.   - Monitor H:H    4. Acute PE of right lung  - LLE venous duplex negative  - Heparin d/c due to hemoperitoneum  - c/w SCD for DVT prophylaxis   - monitor respiratory status closely   - EGD for screening varices prior to anticoagulation?   D/W GI no procedure now.   No anticoagulation now. very high risk to bleed. HB dropped already few points   likely tumor thrombus    5. CAD - old baloon angio - no stents  hold asa for now -restart as outpt if stable     discussed with pt, family at bedside and outside pmd - Dr Walter mayberry for dc and follow up with onc  meds reviewed with pgy 1  time spent 35 mins

## 2018-07-27 ENCOUNTER — INBOUND DOCUMENT (OUTPATIENT)
Age: 73
End: 2018-07-27

## 2018-07-27 LAB
% ALBUMIN: 44.6 % — SIGNIFICANT CHANGE UP
% ALPHA 1: 9.2 % — SIGNIFICANT CHANGE UP
% ALPHA 2: 12.7 % — SIGNIFICANT CHANGE UP
% BETA: 13 % — SIGNIFICANT CHANGE UP
% GAMMA: 20.5 % — SIGNIFICANT CHANGE UP
% M SPIKE: SIGNIFICANT CHANGE UP %
ALBUMIN SERPL ELPH-MCNC: 2.7 G/DL — LOW (ref 3.6–5.5)
ALBUMIN/GLOB SERPL ELPH: 0.8 RATIO — SIGNIFICANT CHANGE UP
ALPHA1 GLOB SERPL ELPH-MCNC: 0.6 G/DL — HIGH (ref 0.1–0.4)
ALPHA2 GLOB SERPL ELPH-MCNC: 0.8 G/DL — SIGNIFICANT CHANGE UP (ref 0.5–1)
ANA PAT FLD IF-IMP: ABNORMAL
ANA TITR SER: ABNORMAL
B-GLOBULIN SERPL ELPH-MCNC: 0.8 G/DL — SIGNIFICANT CHANGE UP (ref 0.5–1)
GAMMA GLOBULIN: 1.2 G/DL — SIGNIFICANT CHANGE UP (ref 0.6–1.6)
INTERPRETATION SERPL IFE-IMP: SIGNIFICANT CHANGE UP
LKM AB SER-ACNC: 1.1 UNITS — SIGNIFICANT CHANGE UP (ref 0–20)
M-SPIKE: SIGNIFICANT CHANGE UP G/DL (ref 0–0)
PROT PATTERN SERPL ELPH-IMP: SIGNIFICANT CHANGE UP

## 2018-08-01 DIAGNOSIS — Z87.891 PERSONAL HISTORY OF NICOTINE DEPENDENCE: ICD-10-CM

## 2018-08-01 DIAGNOSIS — R59.1 GENERALIZED ENLARGED LYMPH NODES: ICD-10-CM

## 2018-08-01 DIAGNOSIS — K59.00 CONSTIPATION, UNSPECIFIED: ICD-10-CM

## 2018-08-01 DIAGNOSIS — I45.81 LONG QT SYNDROME: ICD-10-CM

## 2018-08-01 DIAGNOSIS — Z98.61 CORONARY ANGIOPLASTY STATUS: ICD-10-CM

## 2018-08-01 DIAGNOSIS — Z79.82 LONG TERM (CURRENT) USE OF ASPIRIN: ICD-10-CM

## 2018-08-01 DIAGNOSIS — I81 PORTAL VEIN THROMBOSIS: ICD-10-CM

## 2018-08-01 DIAGNOSIS — D64.9 ANEMIA, UNSPECIFIED: ICD-10-CM

## 2018-08-01 DIAGNOSIS — E87.5 HYPERKALEMIA: ICD-10-CM

## 2018-08-01 DIAGNOSIS — C22.9 MALIGNANT NEOPLASM OF LIVER, NOT SPECIFIED AS PRIMARY OR SECONDARY: ICD-10-CM

## 2018-08-01 DIAGNOSIS — Z66 DO NOT RESUSCITATE: ICD-10-CM

## 2018-08-01 DIAGNOSIS — K75.81 NONALCOHOLIC STEATOHEPATITIS (NASH): ICD-10-CM

## 2018-08-01 DIAGNOSIS — R63.0 ANOREXIA: ICD-10-CM

## 2018-08-01 DIAGNOSIS — K74.60 UNSPECIFIED CIRRHOSIS OF LIVER: ICD-10-CM

## 2018-08-01 DIAGNOSIS — I25.10 ATHEROSCLEROTIC HEART DISEASE OF NATIVE CORONARY ARTERY WITHOUT ANGINA PECTORIS: ICD-10-CM

## 2018-08-01 DIAGNOSIS — K76.6 PORTAL HYPERTENSION: ICD-10-CM

## 2018-08-01 DIAGNOSIS — R16.1 SPLENOMEGALY, NOT ELSEWHERE CLASSIFIED: ICD-10-CM

## 2018-08-01 DIAGNOSIS — R63.4 ABNORMAL WEIGHT LOSS: ICD-10-CM

## 2018-08-01 DIAGNOSIS — I26.99 OTHER PULMONARY EMBOLISM WITHOUT ACUTE COR PULMONALE: ICD-10-CM

## 2018-08-01 DIAGNOSIS — K40.30 UNILATERAL INGUINAL HERNIA, WITH OBSTRUCTION, WITHOUT GANGRENE, NOT SPECIFIED AS RECURRENT: ICD-10-CM

## 2018-08-01 DIAGNOSIS — K66.1 HEMOPERITONEUM: ICD-10-CM

## 2018-08-01 DIAGNOSIS — R18.8 OTHER ASCITES: ICD-10-CM

## 2018-08-01 DIAGNOSIS — E78.5 HYPERLIPIDEMIA, UNSPECIFIED: ICD-10-CM

## 2018-08-01 DIAGNOSIS — R00.0 TACHYCARDIA, UNSPECIFIED: ICD-10-CM

## 2018-08-06 ENCOUNTER — APPOINTMENT (OUTPATIENT)
Dept: SURGERY | Facility: AMBULATORY SURGERY CENTER | Age: 73
End: 2018-08-06

## 2018-08-14 ENCOUNTER — APPOINTMENT (OUTPATIENT)
Dept: SURGERY | Facility: CLINIC | Age: 73
End: 2018-08-14

## 2018-08-15 ENCOUNTER — APPOINTMENT (OUTPATIENT)
Dept: HEMATOLOGY ONCOLOGY | Facility: CLINIC | Age: 73
End: 2018-08-15

## 2018-08-15 ENCOUNTER — OUTPATIENT (OUTPATIENT)
Dept: OUTPATIENT SERVICES | Facility: HOSPITAL | Age: 73
LOS: 1 days | Discharge: HOME | End: 2018-08-15

## 2018-08-15 VITALS
BODY MASS INDEX: 27.55 KG/M2 | SYSTOLIC BLOOD PRESSURE: 148 MMHG | HEART RATE: 122 BPM | RESPIRATION RATE: 14 BRPM | WEIGHT: 186 LBS | HEIGHT: 69 IN | DIASTOLIC BLOOD PRESSURE: 72 MMHG | TEMPERATURE: 96.5 F

## 2018-08-15 DIAGNOSIS — F41.9 ANXIETY DISORDER, UNSPECIFIED: ICD-10-CM

## 2018-08-15 DIAGNOSIS — I81 PORTAL VEIN THROMBOSIS: ICD-10-CM

## 2018-08-15 DIAGNOSIS — I25.10 ATHEROSCLEROTIC HEART DISEASE OF NATIVE CORONARY ARTERY WITHOUT ANGINA PECTORIS: Chronic | ICD-10-CM

## 2018-08-15 DIAGNOSIS — I26.99 OTHER PULMONARY EMBOLISM W/OUT ACUTE COR PULMONALE: ICD-10-CM

## 2018-08-15 DIAGNOSIS — I25.2 OLD MYOCARDIAL INFARCTION: ICD-10-CM

## 2018-08-15 RX ORDER — OXYCODONE 5 MG/1
5 TABLET ORAL
Qty: 30 | Refills: 0 | Status: ACTIVE | COMMUNITY
Start: 2018-08-15 | End: 1900-01-01

## 2018-08-15 RX ORDER — PSYLLIUM SEED
48.57 PACKET (EA) ORAL
Qty: 2 | Refills: 3 | Status: ACTIVE | COMMUNITY
Start: 2018-08-15 | End: 1900-01-01

## 2018-08-15 RX ORDER — ALPRAZOLAM 0.25 MG/1
0.25 TABLET ORAL
Qty: 60 | Refills: 0 | Status: ACTIVE | COMMUNITY
Start: 2018-08-15 | End: 1900-01-01

## 2018-08-15 RX ORDER — SORAFENIB 200 MG/1
200 TABLET, FILM COATED ORAL
Qty: 60 | Refills: 0 | Status: ACTIVE | COMMUNITY
Start: 2018-08-15 | End: 1900-01-01

## 2018-08-15 RX ORDER — ONDANSETRON 8 MG/1
8 TABLET, ORALLY DISINTEGRATING ORAL 3 TIMES DAILY
Qty: 24 | Refills: 3 | Status: ACTIVE | COMMUNITY
Start: 2018-08-15 | End: 1900-01-01

## 2018-08-24 PROBLEM — I26.99 PULMONARY EMBOLISM: Status: ACTIVE | Noted: 2018-08-24

## 2018-08-24 PROBLEM — I81 PORTAL VEIN THROMBOSIS: Status: ACTIVE | Noted: 2018-08-24

## 2018-08-24 NOTE — PHYSICAL EXAM
[Ambulatory and capable of all self care but unable to carry out any work activities] : Status 2- Ambulatory and capable of all self care but unable to carry out any work activities. Up and about more than 50% of waking hours [Normal] : affect appropriate [de-identified] : He was in a wheelcahir. [de-identified] : Has edema in LE [de-identified] : Did have distention

## 2018-08-24 NOTE — HISTORY OF PRESENT ILLNESS
[de-identified] : This is a 72 year old make who is here for follow up for advance HCC. Steph he has a history of  incarcerated R inguinal hernia, HLD, CAD w coronary \par angioplasty no stents, presented with a chief complaint of incidental liver lesions, cirrhosis and portal vein thrombosis found on outpatient US and \par elevated liver enzymes from PCP. Patient had recent history of 20lb weight loss, decreased po intake, weakness, dizziness, and multiple episodes of non \par bloody diarrhea for several weeks before admission. On this admission patient was started on a heparin GGT per vascular surgery recommendations for the \par portal vein thrombosis, his hospital stay was complicated by an episode of hemoperitoneum 2/2 to the therapeutic A/C. The heparin GGT was stopped \par immediately after the critical finding was conveyed by the radiologist. Patient remained asymptomatic with just complaints of mild abdominal discomfort and \par bloating. Hb dropped from 14.1 to 9.9 and has remained stable at 9.3. Patient also was discovered to have incidental R segmental and subsegmental pulmonary \par emboli, but remained asymptomatic LE duplex negative for DVT. MRI confirmed there is a 15.5x10.0x17.2 cm invasive lesion in the R hepatic lobe with 1.4 cm lesion in the L hepatic lobe and tumor invasion into the main portal vein and several of its branches. [place] [address], which suggest  the diagnosis for Hepatocellular Carcinoma. Surgery and IR were consulted and the lesion is surgically unresectable and Catheter directed chemotherapy or Radiation with Y-90 are not an option due to high risk of decompensation IR also suspected this is most likely  HCC and a biipsy was not done. He was seen by GI  for work up of his cirrohsis and it was negative thus most likley its due to BURROWS

## 2018-08-24 NOTE — RESULTS/DATA
[FreeTextEntry1] : MR Abdomen w/wo IV Cont (07.25.18 @ 16:38) >\par \par Nodular liver parenchyma and contour, \par compatible with cirrhosis. Infiltrative arterially enhancing tumor is \par noted throughout the right hepatic lobe, with washout on portal venous \par phase images and persistent capsular enhancement. Conglomerate right \par hepatic tumor measures 15.5 x 10.0 x 17.2 cm. Additional foci of tumor \par within the left hepatic lobe measure up to 1.4 x 1.1 cm within hepatic \par segment 4A/2. Tumor thrombus is noted within the main portal vein and \par extending into the anterior and posterior sectors right portal vein as \par well as the proximal left portal vein. The distal left portal vein is \par attenuated, however is patent. Tumor thrombus is also noted within a \par branch of the right hepatic vein, extending to the IVC. Gallbladder is \par contracted, with cholelithiasis.\par \par Multiple overall unchanged in enlarged upper abdominal \par lymph nodes, measuring up to 1.8 x 1.8 cm in the periportal region.\par

## 2018-08-24 NOTE — REVIEW OF SYSTEMS
[Fatigue] : fatigue [Recent Change In Weight] : ~T recent weight change [Lower Ext Edema] : lower extremity edema [Negative] : Allergic/Immunologic

## 2018-08-24 NOTE — ASSESSMENT
[FreeTextEntry1] : Multifocal HCC with most likely  Stage LISA disease his child Nicole B7-8 he is  [facility] liver cancer stage C, advance \par -no a candidate for liver Directed therapy\par -He is mainly sedentary at home ECOG is a 3\par -I offered palliative care/hospice or a trial of systemic therapy. Nexavar or lenvatinib.  We spoke about multiple side effect of each agent, such as rashes, fatigue, elevated liver enzymes, hypertension, cytopenias to name a few. He wanted to try   treatment , neither him or his family were ready for hospice but they understand how frail he is.\par Will  started him on  Nexevar 200 mg BID if does not tolerate will switch to Opdivo or Lenvatinib \par \par #PE due to malignancy  and thrombosis in portal vein and tumor thrombi in IVC\par -unable to anticoagulate due to intra abdominal bleed with anticoagulation  possible from tumor, high risk\par \par #Cirrhosis due to BURROWS?\par -hep b and c negative, no ETOH \par -EGD  will be done as outpatient possibly \par \par #Acute Anemia \par -from intra abdominal bleed from tumor is suspected\par \par #They asked about prognosis I informed  them that median survival with systemic therapy in clinical trials range from 8-12 months depending on study and second line treatments if a candidate in future. They are aware he is at risk for sudden death due to a PE since not on anticoagulation . \par \par RTC in 2 weeks after he starts Nexevar \par \par Prognosis is poor  [Palliative Care Plan] : Patient was apprised of incurable nature of disease.  Hospice and Palliative care options discussed.

## 2018-08-27 DIAGNOSIS — C22.0 LIVER CELL CARCINOMA: ICD-10-CM

## 2018-08-27 DIAGNOSIS — I81 PORTAL VEIN THROMBOSIS: ICD-10-CM

## 2018-08-27 DIAGNOSIS — I26.99 OTHER PULMONARY EMBOLISM WITHOUT ACUTE COR PULMONALE: ICD-10-CM

## 2018-08-27 DIAGNOSIS — K74.60 UNSPECIFIED CIRRHOSIS OF LIVER: ICD-10-CM

## 2018-08-30 ENCOUNTER — OTHER (OUTPATIENT)
Age: 73
End: 2018-08-30

## 2018-08-30 DIAGNOSIS — C22.0 LIVER CELL CARCINOMA: ICD-10-CM

## 2018-08-30 RX ORDER — FENTANYL 12 UG/H
12 PATCH, EXTENDED RELEASE TRANSDERMAL
Qty: 10 | Refills: 0 | Status: COMPLETED | COMMUNITY
Start: 2018-08-30 | End: 2018-08-30

## 2018-08-30 RX ORDER — MORPHINE SULFATE 20 MG/5ML
20 SOLUTION ORAL 4 TIMES DAILY
Qty: 1 | Refills: 0 | Status: ACTIVE | COMMUNITY
Start: 2018-08-30 | End: 1900-01-01

## 2018-08-30 RX ORDER — FENTANYL 12 UG/H
12 PATCH, EXTENDED RELEASE TRANSDERMAL
Qty: 10 | Refills: 0 | Status: ACTIVE | COMMUNITY
Start: 2018-08-30 | End: 1900-01-01

## 2018-08-31 ENCOUNTER — OUTPATIENT (OUTPATIENT)
Dept: OUTPATIENT SERVICES | Facility: HOSPITAL | Age: 73
LOS: 1 days | End: 2018-08-31

## 2018-08-31 DIAGNOSIS — I25.10 ATHEROSCLEROTIC HEART DISEASE OF NATIVE CORONARY ARTERY WITHOUT ANGINA PECTORIS: Chronic | ICD-10-CM

## 2018-09-04 DIAGNOSIS — C22.0 LIVER CELL CARCINOMA: ICD-10-CM

## 2018-09-05 ENCOUNTER — APPOINTMENT (OUTPATIENT)
Dept: HEMATOLOGY ONCOLOGY | Facility: CLINIC | Age: 73
End: 2018-09-05

## 2018-09-13 NOTE — PROGRESS NOTE ADULT - PROVIDER SPECIALTY LIST ADULT
Hospitalist Unsure of etiology  Remove any potentially harmful medications to the liver  Continue to monitor

## 2018-09-19 DIAGNOSIS — I26.99 OTHER PULMONARY EMBOLISM WITHOUT ACUTE COR PULMONALE: ICD-10-CM

## 2018-09-19 DIAGNOSIS — K74.0 HEPATIC FIBROSIS: ICD-10-CM

## 2018-09-19 DIAGNOSIS — I81 PORTAL VEIN THROMBOSIS: ICD-10-CM

## 2018-11-15 ENCOUNTER — APPOINTMENT (OUTPATIENT)
Dept: CARDIOLOGY | Facility: CLINIC | Age: 73
End: 2018-11-15

## 2021-08-26 NOTE — PATIENT PROFILE ADULT. - NS PRO ABUSE SCREEN SUSPICION NEGLECT YN
I called and spoke with patient, relayed response and recommendations per Dr. Forrest.   All information was verbally understood.     Thanks  Zuri    no

## 2024-01-22 NOTE — H&P ADULT - NSHPSOCIALHISTORY_GEN_ALL_CORE
89 Nonsmoker  Non drinker  No illicit drugs    Occupation: Retired  Active with ADLs  Lives with wife and son